# Patient Record
Sex: FEMALE | Race: WHITE
[De-identification: names, ages, dates, MRNs, and addresses within clinical notes are randomized per-mention and may not be internally consistent; named-entity substitution may affect disease eponyms.]

---

## 2017-01-18 NOTE — DIAGNOSTIC IMAGING REPORT
CHEST ONE VIEW PORTABLE



CLINICAL HISTORY: Dizziness. History of pneumonia.    



COMPARISON STUDY:  Chest radiograph November 16, 2016.



FINDINGS: Lung volumes are normal. There is no pneumothorax or pleural effusion.

There is no evidence of pulmonary edema. Cardiomediastinal silhouette is within

normal limits. 



IMPRESSION:  No acute cardiopulmonary findings. 









Electronically signed by:  Rishabh Mcclain M.D.

1/18/2017 3:50 PM



Dictated Date/Time:  1/18/2017 3:49 PM

## 2017-01-18 NOTE — EMERGENCY ROOM VISIT NOTE
History


First contact with patient:  15:13


Chief Complaint:  NEURO SYMPTOMS


Stated Complaint:  NUMBNESS IN HAND, L LEG INTO TOES


Nursing Triage Summary:  


Patient c/o "numbness in all 10 of her fingers, left toes and up to the knee in 


the leg.  I've been having trouble with it the last couple months off and on. 


Today it got worse. Maybe it's just stress, I don't know."





History of Present Illness


The patient is a 61 year old female who presents to the Emergency Room via 

private vehicle with complaints of "numbness in hand, left leg and the toes".  

The patient states that today around 10:30 AM she was at home sitting when she 

notes her left leg started to go numb from the knee distally.  She felt as if 

she was unable to lift the leg, and notes it felt as if it went completely 

numb.  She feels that when she walks she has to drag the left leg.  In addition 

her 10 fingers also feel numb and that has been occurring off and on for nearly 

one year now and she does have a history of carpal tunnel.  She states that her 

legs have gone "numby" off and on for quite some time but nothing quite as 

severe as today.  There is also associated dizziness which began around 10:30.  

The patient feels she is under stress recently and didn feel that she had 

chills and fever a few days ago but none today.  There is associated blurred 

vision in the right eye which she's had for 8 years she believes this secondary 

to her history of head injuries.  She does have a history of blood clots in her 

legs, but after further clarification she noted noted she was not formally 

diagnosed with DVTs.  She did have abdominal pain yesterday but none today.  

She denies any recent falls, injuries, neck pain today, low back pain, arm 

weakness, chest pain, shortness of breath.





Review of Systems


A complete 10-point Review of Systems was discussed with the patient, with 

pertinent positives and negatives listed in the History of Present Illness. All 

remaining Review of Systems questions can be considered negative unless 

otherwise specified.





Past Medical/Surgical History


Medical Problems:


(1) Chest pain


(2) No Known Active Medical Problems


DVT, skin problems, pneumonia, stomach problems





Family History





FH: HTN (hypertension)


FH: cancer


FH: diabetes mellitus


FH: heart disease


FH: lung disease





Social History


Smoking Status:  Former Smoker


Alcohol Use:  occasionally


Drug Use:  none


Housing Status:  lives alone


Occupation Status:  employed





Current/Historical Medications


No Active Prescriptions or Reported Meds





Allergies


Coded Allergies:  


     No Known Allergies (Unverified , 11/16/16)





Physical Exam


Vital Signs











  Date Time  Temp Pulse Resp B/P Pulse Ox O2 Delivery O2 Flow Rate FiO2


 


1/18/17 21:58  77 18 121/70 97   


 


1/18/17 20:40  69 18 127/84 96 Room Air  


 


1/18/17 18:36  76 18 127/47 95 Room Air  


 


1/18/17 16:55  68 20 127/81 95 Room Air  


 


1/18/17 16:13  72      


 


1/18/17 15:56     93 Room Air  


 


1/18/17 15:56  73 19 123/79 94 Room Air  


 


1/18/17 15:09 36.9 82 22 124/68 96 Room Air  











Physical Exam


VITAL SIGNS - Vital signs and nursing notes were reviewed.  Patient is afebrile

, she is normotensive, she is not tachycardic and is saturating well on room 

air at 96%.


GENERAL -61-year-old female appearing her stated age who is in no acute 

distress.  The patient is standing upon my entrance into the exam room with 

support via cane.  Communicates well with provider and answers questions 

appropriately.


SKIN - Without rashes.  This there are no breaks in the integument.


HEAD - NC/AT.


EYES - PERRL with EOMI bilaterally. Sclera anicteric. Palpebral conjunctiva 

pink and moist with no injection noted.  No walls signs or raccoons eyes.


EARS - No deformities of external structures noted on gross examination 

bilaterally. No hemotympanum. External auditory canals without discharge or 

otorrhea. Tympanic membranes pearly gray without retraction or bulging. No 

fluid or purulent material visualized behind the TM. Handle of malleus, umbo, 

cone of light, pars tensa/flaccid all easily visualized.


NOSE - Midline and without cyanosis. No epistaxis or purulent drainage noted.


MOUTH/OROPHARYNX - Without perioral cyanosis. Buccal mucosa pink and moist and 

without leukoplakia. Tongue midline with equal elevation of palate bilaterally. 


NECK - Neck with FROM. No Meningeal signs.


LUNGS - Chest wall symmetric without accessory muscle use, intercostals 

retractions, or central cyanosis. Normal vesicular breath sounds CTA B/L. No 

wheezes, rales, or rhonchi appreciated.


CARDIAC - RRR with S1/S2. No murmur, rubs, or gallops appreciated. 


EXTREMITIES - No clubbing or peripheral cyanosis. No pretibial edema present. 

Vascularly intact. +5/5 strength noted in UE/LE bilaterally.


NEUROLOGIC - Cranial nerves II through XII grossly intact. Sensory intact to 

light touch throughout. Patellar reflexes +2/4. No neurologic deficit.


PSYCH - A&Ox3 and cooperates fully with examiner. Pt is very pleasant and 

interacts well with examiner.





Medical Decision & Procedures


ER Provider


Diagnostic Interpretation:


CT OF THE HEAD WITHOUT CONTRAST





CLINICAL HISTORY: Left leg numb, finger numbness, dizzy    





COMPARISON STUDY:  Head CT November 16, 2016. 





CT DOSE: 614.27 mGy.cm





TECHNIQUE: Helical axial images of the head were obtained without IV contrast.


Automated exposure control was utilized for the study.





FINDINGS: No acute intracranial hemorrhage, midline shift or mass effect is


present. Ventricular system is normal. The basilar cisterns are patent. There


are no extra-axial collections. Gray-white differentiation is maintained. There


are no findings to suggest acute dural sinus thrombosis or acute territorial


infarct. There are no calvarial abnormalities. Visualized portions of the


sinuses and mastoid air cells are clear.





IMPRESSION:  No acute intracranial findings. 











Electronically signed by:  Rishabh Mcclain M.D.


1/18/2017 3:36 PM





Dictated Date/Time:  1/18/2017 3:33 PM





LEFT LOWER EXTREMITY VENOUS DOPPLER





HISTORY:      Left lower leg edema





COMPARISON STUDY:  None.





FINDINGS: There is normal compressibility, flow, and augmentation within the


left lower extremity deep venous system.





IMPRESSION:  


No DVT within the left lower extremity.











Electronically signed by:  Ad Beaulieu M.D.


1/18/2017 6:24 PM





Dictated Date/Time:  1/18/2017 6:23 PM





LEFT LOWER EXTREMITY ARTERIAL DOPPLER STUDY





HISTORY:      Left lower leg edema, numbness








COMPARISON STUDY:  None.





FINDINGS: There are normal triphasic to biphasic waveforms seen throughout the


left lower extremity arterial system. No elevated velocities to suggest


hemodynamically significant stenosis. No areas of arterial occlusion. There is a


small popliteal cyst measuring 3.6 x 1.3 cm. The ankle brachial indices on the


right measured with the posterior tibial artery was 1.4 and the dorsalis pedis


artery was 1.3. The left measured with the posterior tibial artery was 1.3 and


the dorsalis pedis artery was 1.2.





IMPRESSION:  


No hemodynamically significant stenosis seen within the left lower extremity


arterial system. 











Electronically signed by:  Ad Beaulieu M.D.


1/18/2017 6:27 PM





Dictated Date/Time:  1/18/2017 6:25 PM





CHEST ONE VIEW PORTABLE





CLINICAL HISTORY: Dizziness. History of pneumonia.    





COMPARISON STUDY:  Chest radiograph November 16, 2016.





FINDINGS: Lung volumes are normal. There is no pneumothorax or pleural effusion.


There is no evidence of pulmonary edema. Cardiomediastinal silhouette is within


normal limits. 





IMPRESSION:  No acute cardiopulmonary findings. 














Electronically signed by:  Rishabh Mcclain M.D.


1/18/2017 3:50 PM





Dictated Date/Time:  1/18/2017 3:49 PM





Laboratory Results


1/18/17 15:50








Red Blood Count 4.37, Mean Corpuscular Volume 88.3, Mean Corpuscular Hemoglobin 

31.1, Mean Corpuscular Hemoglobin Concent 35.2, Mean Platelet Volume 10.0, 

Neutrophils (%) (Auto) 61.9, Lymphocytes (%) (Auto) 26.8, Monocytes (%) (Auto) 

7.4, Eosinophils (%) (Auto) 3.5, Basophils (%) (Auto) 0.2, Neutrophils # (Auto) 

5.82, Lymphocytes # (Auto) 2.52, Monocytes # (Auto) 0.70, Eosinophils # (Auto) 

0.33, Basophils # (Auto) 0.02





1/18/17 15:50

















Test


  1/18/17


15:50 1/18/17


18:13 1/18/17


20:34


 


White Blood Count


  9.41 K/uL


(4.8-10.8) 


  


 


 


Red Blood Count


  4.37 M/uL


(4.2-5.4) 


  


 


 


Hemoglobin


  13.6 g/dL


(12.0-16.0) 


  


 


 


Hematocrit 38.6 % (37-47)   


 


Mean Corpuscular Volume


  88.3 fL


() 


  


 


 


Mean Corpuscular Hemoglobin


  31.1 pg


(25-34) 


  


 


 


Mean Corpuscular Hemoglobin


Concent 35.2 g/dl


(32-36) 


  


 


 


Platelet Count


  252 K/uL


(130-400) 


  


 


 


Mean Platelet Volume


  10.0 fL


(7.4-10.4) 


  


 


 


Neutrophils (%) (Auto) 61.9 %   


 


Lymphocytes (%) (Auto) 26.8 %   


 


Monocytes (%) (Auto) 7.4 %   


 


Eosinophils (%) (Auto) 3.5 %   


 


Basophils (%) (Auto) 0.2 %   


 


Neutrophils # (Auto)


  5.82 K/uL


(1.4-6.5) 


  


 


 


Lymphocytes # (Auto)


  2.52 K/uL


(1.2-3.4) 


  


 


 


Monocytes # (Auto)


  0.70 K/uL


(0.11-0.59) 


  


 


 


Eosinophils # (Auto)


  0.33 K/uL


(0-0.5) 


  


 


 


Basophils # (Auto)


  0.02 K/uL


(0-0.2) 


  


 


 


RDW Standard Deviation


  41.3 fL


(36.4-46.3) 


  


 


 


RDW Coefficient of Variation


  12.8 %


(11.5-14.5) 


  


 


 


Immature Granulocyte % (Auto) 0.2 %   


 


Immature Granulocyte # (Auto)


  0.02 K/uL


(0.00-0.02) 


  


 


 


Anion Gap


  10.0 mmol/L


(3-11) 


  


 


 


Est Creatinine Clear Calc


Drug Dose 96.6 ml/min 


  


  


 


 


Estimated GFR (


American) 98.0 


  


  


 


 


Estimated GFR (Non-


American 84.5 


  


  


 


 


BUN/Creatinine Ratio 13.9 (10-20)   


 


Calcium Level


  8.9 mg/dl


(8.5-10.1) 


  


 


 


Magnesium Level


  2.0 mg/dl


(1.8-2.4) 


  


 


 


Total Bilirubin


  0.2 mg/dl


(0.2-1) 


  


 


 


Aspartate Amino Transf


(AST/SGOT) 11 U/L (15-37) 


  


  


 


 


Alanine Aminotransferase


(ALT/SGPT) 18 U/L (12-78) 


  


  


 


 


Alkaline Phosphatase


  71 U/L


() 


  


 


 


Troponin I


  < 0.015 ng/ml


(0-0.045) 


  


 


 


Total Protein


  7.1 gm/dl


(6.4-8.2) 


  


 


 


Albumin


  4.0 gm/dl


(3.4-5.0) 


  


 


 


Globulin


  3.1 gm/dl


(2.5-4.0) 


  


 


 


Albumin/Globulin Ratio 1.3 (0.9-2)   


 


Thyroid Stimulating Hormone


(TSH) 0.718 uIu/ml


(0.300-4.500) 


  


 


 


Lyme Disease IgG Antibody NEG (NEG)   


 


Lyme Disease IgM Antibody NEG (NEG)   


 


Prothrombin Time


  


  10.3 SECONDS


(9.0-12.0) 


 


 


Prothromb Time International


Ratio 


  1.0 (0.9-1.1) 


  


 


 


Activated Partial


Thromboplast Time 


  26.8 SECONDS


(21.0-31.0) 


 


 


Partial Thromboplastin Ratio  1.0  


 


Urine Color   YELLOW 


 


Urine Appearance   CLEAR (CLEAR) 


 


Urine pH   6.5 (4.5-7.5) 


 


Urine Specific Gravity


  


  


  1.004


(1.000-1.030)


 


Urine Protein   NEG (NEG) 


 


Urine Glucose (UA)   NEG (NEG) 


 


Urine Ketones   NEG (NEG) 


 


Urine Occult Blood   NEG (NEG) 


 


Urine Nitrite   NEG (NEG) 


 


Urine Bilirubin   NEG (NEG) 


 


Urine Urobilinogen   NEG (NEG) 


 


Urine Leukocyte Esterase   NEG (NEG) 


 


Urine Opiates Screen   NEG (NEG) 


 


Urine Methadone, Qualitative   NEG (NEG) 


 


Urine Barbiturates   NEG (NEG) 


 


Urine Phencyclidine (PCP)


Level 


  


  NEG (NEG) 


 


 


Ur


Amphetamine/Methamphetamine 


  


  NEG (NEG) 


 


 


MDMA (Ecstasy) Screen   NEG (NEG) 


 


Urine Benzodiazepines Screen   NEG (NEG) 


 


Urine Cocaine Metabolite   NEG (NEG) 


 


Urine Marijuana (THC)   NEG (NEG) 











Medical Decision


Patient was seen and evaluated as above.  After obtaining a thorough history 

and physical examination IV access was obtained and a stat CT of the head was 

ordered after concern for potential stroke like symptoms.  Stat CT was obtained 

and was negative.  IV access was initiated and a CBC, CMP, TSH, magnesium, 

regulation studies, UA clean catch culture if indicated, troponin, EKG, chest 1 

view portable, urine drug screen, Lyme screen, RPR, NIH stroke scale secondary 

to subjective and objective information findings.  Monitor was applied and 

continuous pulse ox was initiated.  Above workup was relatively unremarkable 

with a CBC revealing no leukocytosis or anemia, there was no coagulation deficit

, CMP revealed no elect slight abnormality or evidence of kidney failure.  AST 

was slightly low at 11.  TSH was within normal limits.  Lyme disease screen 

negative with RPR pending.  Toxicology screen negative as well as urine 

negative.  With the above workup being relatively unremarkable and the EKG 

revealing a normal sinus rhythm rate of 76 bpm and negative troponin I do not 

suspect any emergent or surgical nature the patient's symptoms at this time.  I 

then ordered an ultrasound of the left lower extremity to verify good arterial 

and venous supply and rule out clot.  Results as above.  No vascular 

compromise.  Chest x-ray results are obtained and was within normal limits.  

Today the patient's workup was unremarkable therefore believe that emergent or 

surgical causes of her symptoms at this time are less likely.  The patient's 

vital signs while here were stable.  Patient was not anemic.  I do believe the 

patient is able to follow-up in the outpatient setting with a neurologist whom 

she indicated she followed with in the past and had good success.  She was 

instructed to call him tomorrow as well as her family doctor to follow up from 

today's visit.  She was educated upon worrisome symptoms in which to return, 

had questions answered prior to discharge and was discharged home in good 

condition.





In evaluation treatment this patient the following differential diagnoses were 

entertained: Arterial compromise of the leg, DVT, cardiac event, pneumonia, 

neurologic disorder, syphilis, Lyme disease, among many others.  I do not 

suspect a leg numbness secondary to cauda equina syndrome as it is only from 

the knee down and she does not have any numbness or tingling in the genital 

region or incontinence.





Impression





 Primary Impression:  


 Dizzy


 Additional Impression:  


 Left leg numbness





Departure Information


Dispostion


Home / Self-Care





Condition


GOOD





Prescriptions





No Active Prescriptions or Reported Meds





Referrals


No Doctor, Assigned (PCP)








YANI Sierra M.D.





Patient Instructions


My [facility]





Additional Instructions





You were seen in the emergency department for dizziness, numbness of your leg 

and fingers.





Your blood work did not reveal any sign of infection or anemia.


There was no slight abnormality.  The kidneys are working well.  Your AST level 

is slightly low at 11.


Your urine was normal.  Your Lyme disease test was negative.





The ultrasounds of your leg did not show any problems with blood flow.  Your 

arteries and veins are working well.


The x-ray of your chest did not reveal any emergent findings.


The CT of your head did not reveal any emergent findings.





Please follow-up with your family doctor regarding today's visit as well as a 

neurologist.  You were given the number for Dr. Sierra.  Please call her family 

doctor and Dr. Sierra first thing tomorrow morning to schedule follow-up.





Please eat a healthy and well balanced diet and drink plenty of water.





Please return to the emergency department with any new/concerning symptoms.





Problem Qualifiers

## 2017-01-18 NOTE — DIAGNOSTIC IMAGING REPORT
CT OF THE HEAD WITHOUT CONTRAST



CLINICAL HISTORY: Left leg numb, finger numbness, dizzy    



COMPARISON STUDY:  Head CT November 16, 2016. 



CT DOSE: 614.27 mGy.cm



TECHNIQUE: Helical axial images of the head were obtained without IV contrast.

Automated exposure control was utilized for the study.



FINDINGS: No acute intracranial hemorrhage, midline shift or mass effect is

present. Ventricular system is normal. The basilar cisterns are patent. There

are no extra-axial collections. Gray-white differentiation is maintained. There

are no findings to suggest acute dural sinus thrombosis or acute territorial

infarct. There are no calvarial abnormalities. Visualized portions of the

sinuses and mastoid air cells are clear.



IMPRESSION:  No acute intracranial findings. 







Electronically signed by:  Rishabh Mcclain M.D.

1/18/2017 3:36 PM



Dictated Date/Time:  1/18/2017 3:33 PM

## 2017-01-18 NOTE — DIAGNOSTIC IMAGING REPORT
LEFT LOWER EXTREMITY VENOUS DOPPLER



HISTORY:      Left lower leg edema



COMPARISON STUDY:  None.



FINDINGS: There is normal compressibility, flow, and augmentation within the

left lower extremity deep venous system.



IMPRESSION:  

No DVT within the left lower extremity.







Electronically signed by:  Ad Beaulieu M.D.

1/18/2017 6:24 PM



Dictated Date/Time:  1/18/2017 6:23 PM

## 2017-01-18 NOTE — DIAGNOSTIC IMAGING REPORT
LEFT LOWER EXTREMITY ARTERIAL DOPPLER STUDY



HISTORY:      Left lower leg edema, numbness





COMPARISON STUDY:  None.



FINDINGS: There are normal triphasic to biphasic waveforms seen throughout the

left lower extremity arterial system. No elevated velocities to suggest

hemodynamically significant stenosis. No areas of arterial occlusion. There is a

small popliteal cyst measuring 3.6 x 1.3 cm. The ankle brachial indices on the

right measured with the posterior tibial artery was 1.4 and the dorsalis pedis

artery was 1.3. The left measured with the posterior tibial artery was 1.3 and

the dorsalis pedis artery was 1.2.



IMPRESSION:  

No hemodynamically significant stenosis seen within the left lower extremity

arterial system. 







Electronically signed by:  Ad Beaulieu M.D.

1/18/2017 6:27 PM



Dictated Date/Time:  1/18/2017 6:25 PM

## 2017-05-17 NOTE — EMERGENCY ROOM VISIT NOTE
ED Visit Note


First contact with patient:  11:17


Chief Complaint: Left lower leg pain.





History of Present Illness: Geovanna gregory is a 61-year-old white female who is 

ambulates into the ED using a cane complaining of left lower leg pain, swelling 

and numbness.


Patient reports she was her normal self until approximately 2 weeks ago.  She 

reports after being outside she felt the back of her leg and noted a tick 

embedded in her leg over the Achilles tendon.  She removed the tick without 

difficulty but was unsure she removed all of the tick.  She reports over the 

last 2 weeks she has noted the area where the tick removed started scabbing 

over and became painful.  She goes on to report that over the last 2 days she 

has developed numbness sensation throughout the lower leg and she feels that 

her leg is swollen.  Additionally she reports that she feels like she has 

weakness in her knee and that when she ambulates is going to give out.


She describes her discomfort as a throbbing sensation throughout the lower leg.

  She rates her discomfort 6/10.  Her pain is nonradiating.  Her pain worsens 

with ambulation.  She has not identified any alleviating factors related to the 

pain.  She has not taken any medications for pain prior to arrival at the 

hospital.  As previously noted associated with her pain she does report she has 

a numbness sensation throughout the leg and she feels that the leg is swollen.  

Additionally she reports last evening she was running a temperature of 100F 

and is unsure this has anything to do with her current symptoms.


She denies skin eruptions, skin color changes, headache, dizziness, 

lightheadedness, upper respiratory tract symptoms, shortness of breath, cough, 

abdominal pain, nausea, vomiting, decreased appetite, previous clots, 

claudication, cramping, other joint pains, previous Lyme's disease.





Review of Systems: As noted above in history of present illness.  All body 

systems were reviewed and found to be negative as noted above.





Past Medical History: Unspecified heart disease, unspecified skin disorder, 

pneumonia, unspecified stomach disorder, lower extremity DVT.


Current Medications: Patient denies.


Allergies to Medications: Patient denies.


Social History: Patient is not currently employed; she feels safe in her home 

environment; she admits to tobacco and alcohol use.





Physical Examination:


Vital Signs:








  Date Time  Temp Pulse Resp B/P Pulse Ox O2 Delivery O2 Flow Rate FiO2


 


5/17/17 13:10  74 18 111/90 97   


 


5/17/17 10:20 37.0 79 22 126/59 95 Room Air  





GENERAL: 61-year-old  female in mild distress due to pain, nontoxic-appearing, 

afebrile and hemodynamically stable.


NEUROLOGICAL: Awake, alert and oriented to person, place and time.  Answering 

questions appropriately and following commands.   Good hand eye coordination.  

No focal motor or sensory deficits.


SKIN: Warm, dry and pink.  Left Lower Extremity: Over the posterior aspect of 

the lower leg over the Achilles tendon patient has a dime sized scab lesion.  

There is no local erythema or edema.  There is no local lymphadenitis.  I did 

not unroofed the lesion to see if there is any remanent of tick.  This area is 

mildly tender to palpation but does not appear infected.


HEENT:  Atraumatic and normocephalic.  PERRLA.  Sclera white and conjunctiva 

pink.  No drainage from naris.  Oral cavity moist and pink.  Pharynx is 

nonerythematous or edematous.  Speech normal.  No lymphadenopathy.  Trachea 

midline.  No jugular venous distention.


THORAX:  Lungs sounds are clear to auscultation and equal bilaterally with 

symmetrical chest wall.  No wheezing, rales or rhonchi.  No crepitus, tenderness

, subcutaneous air or deformities noted.


HEART:  Regular rate and rhythm.  No gallops, rubs or murmurs are appreciated.


ABDOMEN: Flat, soft and nontender.  Positive bowel sounds in all quadrants.  No 

guarding, rigidity or organomegaly.


LEFT LOWER EXTREMITY: No gross bony deformity.  No shortening or malrotation.  

No tenderness in the hip, knee, ankle or foot.  Negative ballottement test.  

Negative apprehension test.  No laxity of the collateral cruciate ligaments.  

No lateral or medial joint line tenderness.  Negative Chaya's test.  Mild 

tenderness over her scabbed area as previously noted on SKIN.  I do not 

appreciate any calf tenderness or cords.  There is mild dependent edema in the 

leg.  Full range of motion of the hip, knee, ankle and foot.  Distal pulses are 

intact and equal bilaterally.  Capillary refill is brisk in the feet.  Patient 

was able to distinguish light sensations through all dermatomes of the feet.  

No calf tenderness or cords.





ED Course:


Patient is assessed as noted above.


Laboratory Testing:








Test


  5/17/17


11:50 Range/Units


 


 


White Blood Count 8.65 4.8-10.8  K/uL


 


Red Blood Count 4.55 4.2-5.4  M/uL


 


Hemoglobin 14.1 12.0-16.0  g/dL


 


Hematocrit 41.3 37-47  %


 


Mean Corpuscular Volume 90.8   fL


 


Mean Corpuscular Hemoglobin 31.0 25-34  pg


 


Mean Corpuscular Hemoglobin


Concent 34.1


  32-36  g/dl


 


 


Platelet Count 242 130-400  K/uL


 


Mean Platelet Volume 9.8 7.4-10.4  fL


 


Neutrophils (%) (Auto) 68.6  %


 


Lymphocytes (%) (Auto) 23.1  %


 


Monocytes (%) (Auto) 5.5  %


 


Eosinophils (%) (Auto) 2.4  %


 


Basophils (%) (Auto) 0.3  %


 


Neutrophils # (Auto) 5.92 1.4-6.5  K/uL


 


Lymphocytes # (Auto) 2.00 1.2-3.4  K/uL


 


Monocytes # (Auto) 0.48 0.11-0.59  K/uL


 


Eosinophils # (Auto) 0.21 0-0.5  K/uL


 


Basophils # (Auto) 0.03 0-0.2  K/uL


 


RDW Standard Deviation 43.2 36.4-46.3  fL


 


RDW Coefficient of Variation 12.9 11.5-14.5  %


 


Immature Granulocyte % (Auto) 0.1  %


 


Immature Granulocyte # (Auto) 0.01 0.00-0.02  K/uL


 


Sodium Level 140 136-145  mmol/L


 


Potassium Level 4.0 3.5-5.1  mmol/L


 


Chloride Level 105   mmol/L


 


Carbon Dioxide Level 31 21-32  mmol/L


 


Anion Gap 4.0 3-11  mmol/L


 


Blood Urea Nitrogen 20 7-18  mg/dl


 


Creatinine


  0.72


  0.60-1.20


mg/dl


 


Est Creatinine Clear Calc


Drug Dose 106.8


   ml/min


 


 


Estimated GFR (


American) 106.2


   


 


 


Estimated GFR (Non-


American 91.7


   


 


 


BUN/Creatinine Ratio 27.8 10-20  


 


Random Glucose 95 70-99  mg/dl


 


Calcium Level 8.8 8.5-10.1  mg/dl


 


Lyme Disease IgG Antibody NEG NEG  


 


Lyme Disease IgM Antibody NEG NEG  





Left Lower Leg Venous Doppler Ultrasound: Was reviewed by myself and read by 

the radiologist showing no deep vein thrombus.


Patient was reassessed multiple times during her stay in the emergency 

department.


Patient's knee was wrapped with an Ace bandage for support.


Patient's case was reviewed with Dr. Barrientos; we agreed on diagnostic 

approach, treatment, disposition and plan.


Patient was educated about today's findings and instructed on her treatment plan

; she verbalizes understanding and agreement with this plan.





Clinical Impression: Left lower leg pain.  Mild dependent edema.  Recent tick 

bite.





Decision-Making: Initially my differential diagnosis I considered Lyme's disease

, DVT, skin infection, Achilles tendinitis and other causes.





Disposition: Patient discharged home in stable condition; prior to departure 

she was reassessed and subjectively reported she was feeling better and rated 

her discomfort 3/10.





Plan:


Patient was encouraged to alternate ibuprofen and acetaminophen every 3 hours 

as needed for pain.


Patient was encouraged to continue using the Ace bandage and her cane for 

ambulation.


Patient was encouraged to keep her leg elevated while at rest.


Patient was encouraged to follow-up with her PCP for recheck.


Patient was encouraged return the ED for worsening/uncontrolled pain, 

increasing swelling, worsening leg numbness/tingling, the development of redness

, swelling, red streaking, pus like drainage or fever in the area of her tick 

wound or any new/concerning symptoms.

## 2017-05-17 NOTE — DIAGNOSTIC IMAGING REPORT
Venous Doppler left leg LEFT VENOUS DOPP LOWER EXT UNILAT



CLINICAL HISTORY: L lower leg, swelling and weakness pain. Edema.



TECHNIQUE: Venous Doppler



COMPARISON STUDY:  1/18/2017



FINDINGS: Normal study



IMPRESSION:  Normal study 









Electronically signed by:  Ziyad Green M.D.

5/17/2017 12:11 PM



Dictated Date/Time:  5/17/2017 12:11 PM

## 2018-01-19 NOTE — DIAGNOSTIC IMAGING REPORT
HEAD WITHOUT CONTRAST (CT)



CLINICAL HISTORY: 62 years-old Female with confusion.  Acute confusion  



TECHNIQUE: Multiple axial CT images of the head were obtained without contrast. 

A dose lowering technique was utilized adhering to the principles of ALARA.



CT DOSE:  537.48 mGy.cm



COMPARISON: CT head 1/18/2017.



FINDINGS: 



No acute intracranial hemorrhage, midline shift, intracranial mass,

hydrocephalus, territorial ischemia or abnormal extra-axial collection.



The calvarium is intact.  The paranasal sinuses, mastoid air cells, and middle

ear cavities are clear. Large right kymberly bullosa. Mild leftward bowing of the

nasal septum.



IMPRESSION:  No acute intracranial abnormality.







The above report was generated using voice recognition software. It may contain

grammatical, syntax or spelling errors.







Electronically signed by:  Stepan Gupta M.D.

1/19/2018 6:46 PM



Dictated Date/Time:  1/19/2018 6:44 PM

## 2018-01-19 NOTE — EMERGENCY ROOM VISIT NOTE
ED Visit Note


First contact with patient:  16:37


Staff note:


I have reviewed the Patients chart and have discussed this case with my PA. I 

generally agree with the ED note and findings.

## 2018-01-19 NOTE — DIAGNOSTIC IMAGING REPORT
(CHEST FOR PE) ANGIO WITH



CT DOSE: 2265.93 mGy.cm



HISTORY:  62 years-old Female presents with acute atypical chest pain



TECHNIQUE: Multiple CTA images of the chest were obtained after the intravenous

administration of 116 ml Optiray 320.  Coronal and sagittal MIPS were obtained

from the axial data set and were submitted for review.  A dose lowering

technique was utilized adhering to the principles of ALARA.



COMPARISON: Portable chest radiograph of same day.



FINDINGS: 



CTA:  

Heart appears normal in size without pericardial effusion. Thoracic aorta is

normal in both course and caliber without aneurysm or dissection. 4 vessel

aortic arch is noted with the left vertebral artery emanating directly from the

arch. Imaged great vessels appear to be patent. Pulmonary arterial tree is

opacified to the level of the proximal lobar branches. The segmental and

subsegmental branches are not well opacified secondary to contrast bolus timing.

Within the limitations of the study, no evidence of pulmonary thromboembolic

disease.

 

CT CHEST:  

Thyroid appears homogeneous. No pathologic adenopathy of the chest identified.



No pneumothorax, pleural effusion, focal airspace consolidation or overt

pulmonary edema. There are no suspicious pulmonary nodules or masses identified.

Central airways appear patent.



No acute abnormality of the imaged upper abdomen. Soft tissues are unremarkable.

Bones appear intact. Multilevel endplate degenerative changes of the spine.





IMPRESSION:  

1. No acute intrathoracic abnormality identified, specifically no acute aortic

pathology or evidence of pulmonary thromboembolic disease. Study is limited as

above secondary to contrast bolus timing.

2. No pathologic adenopathy or lobar airspace consolidation.







The above report was generated using voice recognition software. It may contain

grammatical, syntax or spelling errors.







Electronically signed by:  Stepan Gupta M.D.

1/19/2018 10:04 PM



Dictated Date/Time:  1/19/2018 9:59 PM

## 2018-01-19 NOTE — DIAGNOSTIC IMAGING REPORT
ABDOMEN AND PELVIS CT WITH IV CONTRAST



HISTORY: Acute generalized abdominal pain  abd pain



TECHNIQUE: Multiaxial CT images of the abdomen and pelvis were performed

following the use of intravenous contrast.  A dose lowering technique was

utilized adhering to the principles of ALARA.



COMPARISON STUDY: CTA of the chest of same day.



FINDINGS: 

Lung bases appear clear. No pneumatosis or pneumoperitoneum. Imaged inferior

cardiac chambers are unremarkable.



Liver, spleen, pancreas and right adrenal gland appear unremarkable. Thickening

of the left adrenal gland suggests hyperplasia. Gallbladder is unremarkable.

Intermediate attenuating 2.5 cm circumscribed lesion of the medial interpolar

left kidney is noted. Parenchymal scarring is noted involving the superior pole

right kidney. No renal calculi or hydronephrosis. Ureters and urinary bladder

are within normal limits. There is an ovoid 5.1 x 5.5 cm lesion involving the

left hemipelvis within the left aspect of the uterus. Adnexa appear

unremarkable.



Aorta is normal in course and caliber. No bulky adenopathy. Probable lipoma of

the third portion duodenum measures 7 mm. No bowel obstruction or focal bowel

wall thickening. The appendix appears normal.



Soft tissues are unremarkable. Small calcified granuloma the left subcutaneous

gluteal region. Multilevel endplate degenerative changes of the spine.





IMPRESSION: 



1. No acute intra-abdominal or intrapelvic abnormality identified.

2. 5.1 x 5.5 cm ovoid lesion of the left aspect of the uterus suggests uterine

leiomyoma. 

3. Intermediate attenuating ovoid 2.5 cm lesion of the medial interpolar left

kidney may reflect a mildly complex renal cyst and could be further

characterized with a renal ultrasound.

4. Normal appendix.







Electronically signed by:  Stepan Gupta M.D.

1/19/2018 10:12 PM



Dictated Date/Time:  1/19/2018 10:04 PM

## 2018-01-19 NOTE — DIAGNOSTIC IMAGING REPORT
CHEST ONE VIEW PORTABLE



HISTORY:  62 years-old Female fever acute fever with flulike symptoms



COMPARISON: Chest radiograph 1/18/2017



TECHNIQUE: Portable AP view of the chest



FINDINGS: 

Cardiomediastinal and hilar silhouettes are within normal limits.

Atherosclerosis of the aorta. Right lung apex is partially secured by the

patient's chin. No pneumothorax, pleural effusion, focal airspace consolidation

or overt pulmonary edema. The bones of the chest appear grossly intact.



IMPRESSION: No acute process. 







The above report was generated using voice recognition software. It may contain

grammatical, syntax or spelling errors.







Electronically signed by:  Stepan Gupta M.D.

1/19/2018 5:00 PM



Dictated Date/Time:  1/19/2018 4:59 PM

## 2018-01-19 NOTE — EMERGENCY ROOM VISIT NOTE
History


First contact with patient:  15:45


Chief Complaint:  FLU LIKE SX


Stated Complaint:  FLU LIKE SX, DIZZY, NAUSEA, FULL BODY PAIN,





History of Present Illness


The patient is a 62 year old female who presents to the Emergency Room with 

complaints of flulike symptoms.  The patient was brought in by the [place] 

police.  She reportedly got aggressive towards a home health care worker taking 

care of her mother.  The patient is currently refusing most of my questions.  

She does complain of a headache and fever.  She also complains of bilateral 

hand numbness and tingling.  This has been going on for several days.  She is 

concerned that she may have Lyme disease as she had a tick bite last year.





I contacted the patient's daughter, who says that she has a history of "

chemical imbalance."  She has not been taking any medications for years.  She 

also reportedly has not been to a doctor in several years.  She is reportedly 

living with her mother.  She denies any suicidal or homicidal ideations.





Review of Systems


10 system review performed and  negative unless noted in HPI or below.  This 

was done to the best of my ability





Past Medical/Surgical History


Medical Problems:


(1) Altered mental status


(2) Chest pain


(3) No Known Active Medical Problems








Family History





FH: HTN (hypertension)


FH: cancer


FH: diabetes mellitus


FH: heart disease


FH: lung disease





Social History


Smoking Status:  Never Smoker


Alcohol Use:  occasionally


Drug Use:  none


Housing Status:  lives with family


Occupation Status:  employed





Current/Historical Medications


Unable to Obtain Active Prescriptions or Reported Meds





Physical Exam


Vital Signs











  Date Time  Temp Pulse Resp B/P (MAP) Pulse Ox O2 Delivery O2 Flow Rate FiO2


 


1/19/18 21:11  87 16 137/92 96 Room Air  


 


1/19/18 20:00  89 16 139/92 97 Room Air  


 


1/19/18 19:28  97 16 105/77 95 Room Air  


 


1/19/18 18:51  97 16 109/89 94 Room Air  


 


1/19/18 17:44  95 16 126/88 93 Room Air  


 


1/19/18 16:37 36.9 96 16 103/85 94 Room Air  











Physical Exam


VITALS: Vitals are noted on the nurse's note and reviewed by myself.  Vital 

signs stable.


GENERAL: 62-year-old female, disheveled in appearance,


SKIN: The skin was without rashes, erythema, edema, or bruising. 


HEAD: Normocephalic atraumatic.  


EYES: Pupils equal round and reactive to light and accommodation.  Conjunctivae 

without injection, sclerae without icterus.  Extraocular movements intact.  


\MOUTH: Mucous membranes slightly dry


NECK: Supple without nuchal rigidity. .  No JVD.


HEART: Regular rate and rhythm without murmurs gallops or rubs.


LUNGS: Clear to auscultation bilaterally without wheezes, rales or rhonchi.   

No accessory muscle use.


ABDOMEN: Positive bowel sounds x 4.Soft, 


MUSCULOSKELETAL: No muscle atrophy, erythema, or edema noted.   Strength 5/5 

throughout.


NEURO: Patient was alert and oriented to person and place.  She does not follow 

commands.  She is moving all of her extremities without difficulty.





Medical Decision & Procedures


Laboratory Results


1/19/18 16:51








Red Blood Count 4.76, Mean Corpuscular Volume 90.3, Mean Corpuscular Hemoglobin 

30.9, Mean Corpuscular Hemoglobin Concent 34.2, Mean Platelet Volume 10.2, 

Neutrophils (%) (Auto) 77.3, Lymphocytes (%) (Auto) 15.5, Monocytes (%) (Auto) 

5.3, Eosinophils (%) (Auto) 1.3, Basophils (%) (Auto) 0.3, Neutrophils # (Auto) 

8.67, Lymphocytes # (Auto) 1.74, Monocytes # (Auto) 0.60, Eosinophils # (Auto) 

0.15, Basophils # (Auto) 0.03





1/19/18 16:51

















Test


  1/19/18


16:51 1/19/18


16:54 1/19/18


16:58 1/19/18


19:55


 


White Blood Count


  11.22 K/uL


(4.8-10.8) 


  


  


 


 


Red Blood Count


  4.76 M/uL


(4.2-5.4) 


  


  


 


 


Hemoglobin


  14.7 g/dL


(12.0-16.0) 


  


  


 


 


Hematocrit 43.0 % (37-47)    


 


Mean Corpuscular Volume


  90.3 fL


() 


  


  


 


 


Mean Corpuscular Hemoglobin


  30.9 pg


(25-34) 


  


  


 


 


Mean Corpuscular Hemoglobin


Concent 34.2 g/dl


(32-36) 


  


  


 


 


Platelet Count


  273 K/uL


(130-400) 


  


  


 


 


Mean Platelet Volume


  10.2 fL


(7.4-10.4) 


  


  


 


 


Neutrophils (%) (Auto) 77.3 %    


 


Lymphocytes (%) (Auto) 15.5 %    


 


Monocytes (%) (Auto) 5.3 %    


 


Eosinophils (%) (Auto) 1.3 %    


 


Basophils (%) (Auto) 0.3 %    


 


Neutrophils # (Auto)


  8.67 K/uL


(1.4-6.5) 


  


  


 


 


Lymphocytes # (Auto)


  1.74 K/uL


(1.2-3.4) 


  


  


 


 


Monocytes # (Auto)


  0.60 K/uL


(0.11-0.59) 


  


  


 


 


Eosinophils # (Auto)


  0.15 K/uL


(0-0.5) 


  


  


 


 


Basophils # (Auto)


  0.03 K/uL


(0-0.2) 


  


  


 


 


RDW Standard Deviation


  41.9 fL


(36.4-46.3) 


  


  


 


 


RDW Coefficient of Variation


  12.7 %


(11.5-14.5) 


  


  


 


 


Immature Granulocyte % (Auto) 0.3 %    


 


Immature Granulocyte # (Auto)


  0.03 K/uL


(0.00-0.02) 


  


  


 


 


Anion Gap


  7.0 mmol/L


(3-11) 


  


  


 


 


Est Creatinine Clear Calc


Drug Dose 110.8 ml/min 


  


  


  


 


 


Estimated GFR (


American) 109.7 


  


  


  


 


 


Estimated GFR (Non-


American 94.6 


  


  


  


 


 


BUN/Creatinine Ratio 14.0 (10-20)    


 


Calcium Level


  9.2 mg/dl


(8.5-10.1) 


  


  


 


 


Magnesium Level


  1.9 mg/dl


(1.8-2.4) 


  


  


 


 


Total Bilirubin


  0.3 mg/dl


(0.2-1) 


  


  


 


 


Aspartate Amino Transf


(AST/SGOT) 12 U/L (15-37) 


  


  


  


 


 


Alanine Aminotransferase


(ALT/SGPT) 20 U/L (12-78) 


  


  


  


 


 


Alkaline Phosphatase


  77 U/L


() 


  


  


 


 


Troponin I


  < 0.015 ng/ml


(0-0.045) 


  


  


 


 


Total Protein


  7.8 gm/dl


(6.4-8.2) 


  


  


 


 


Albumin


  4.2 gm/dl


(3.4-5.0) 


  


  


 


 


Globulin


  3.6 gm/dl


(2.5-4.0) 


  


  


 


 


Albumin/Globulin Ratio 1.2 (0.9-2)    


 


Thyroid Stimulating Hormone


(TSH) 1.340 uIu/ml


(0.300-4.500) 


  


  


 


 


Salicylates Level


  7.2 mg/dl


(2.8-20) 


  


  


 


 


Acetaminophen Level


  < 2 ug/ml


(10-30) 


  


  


 


 


Ethyl Alcohol mg/dL


  < 3.0 mg/dl


(0-3) 


  


  


 


 


Lyme Disease IgG Antibody NEG (NEG)    


 


Lyme Disease IgM Antibody NEG (NEG)    


 


Influenza Type A Antigen


  


  


  Neg for Influ


A (NEG) 


 


 


Influenza Type B Antigen


  


  


  Neg for Influ


B (NEG) 


 


 


Urine Color    YELLOW 


 


Urine Appearance    CLEAR (CLEAR) 


 


Urine pH    5.5 (4.5-7.5) 


 


Urine Specific Gravity


  


  


  


  1.015


(1.000-1.030)


 


Urine Protein    NEG (NEG) 


 


Urine Glucose (UA)    NEG (NEG) 


 


Urine Ketones    NEG (NEG) 


 


Urine Occult Blood    NEG (NEG) 


 


Urine Nitrite    NEG (NEG) 


 


Urine Bilirubin    NEG (NEG) 


 


Urine Urobilinogen    NEG (NEG) 


 


Urine Leukocyte Esterase    NEG (NEG) 


 


Test


  1/19/18


20:10 


  


  


 


 


Urine Opiates Screen NEG (NEG)    


 


Urine Methadone, Qualitative NEG (NEG)    


 


Urine Barbiturates NEG (NEG)    


 


Urine Phencyclidine (PCP)


Level NEG (NEG) 


  


  


  


 


 


Ur


Amphetamine/Methamphetamine NEG (NEG) 


  


  


  


 


 


MDMA (Ecstasy) Screen NEG (NEG)    


 


Urine Benzodiazepines Screen NEG (NEG)    


 


Urine Cocaine Metabolite NEG (NEG)    


 


Urine Marijuana (THC) NEG (NEG)    











Medications Administered











 Medications


  (Trade)  Dose


 Ordered  Sig/Katie


 Route  Start Time


 Stop Time Status Last Admin


Dose Admin


 


 Acetaminophen


  (Tylenol Tab)  1,000 mg  NOW  STAT


 PO  1/19/18 18:17


 1/19/18 18:18 DC 1/19/18 18:23


1,000 MG


 


 Ketorolac


 Tromethamine


  (Toradol Inj)  30 mg  NOW  STAT


 IV  1/19/18 18:17


 1/19/18 18:18 DC 1/19/18 18:24


30 MG











ECG


Indication:  other


Rate (beats per minute):  98


Rhythm:  normal sinus


Findings:  other (Q waves in the anterior leads.)


Change:  no significant change





ED Course


Patient was seen and examined


Vital signs including  blood pressure were reviewed


medications list was verified with patient


Labs were obtained, and a saline lock was established


Upon reevaluation, the patient was answering most questions appropriately.  She 

would however occasionally answer inappropriately.


She was evaluated by the psychiatric liaison.


I discussed the patient's workup with her and her family.  They voiced 

understanding.


The case was discussed with supervising physician who personally evaluated the 

patient.


It was also discussed with case management.  


I had a long discussion with the family regarding possible diagnosis and 

treatment plans.  They did not feel comfortable discharging her home.


I spoke with the [facility] hospitalist group, who kindly agreed to admit the 

patient for further evaluation





Medical Decision


Differential diagnosis: TIA, CVA, medical noncompliance, neglect, altered 

mental status, metabolic encephalopathy, infectious etiology, john, other 

psychiatric disorders





This patient is a 62-year-old female that was brought into the emergency 

department by the police with complaints of flulike symptoms and numbness and 

tingling in her hands.  Upon arrival, it was unclear if the patient was able to 

make decisions for her care as she was refusing to answer any questions.  She 

also clearly has an underlying psychiatric disorder.  The etiology of her 

numbness and tingling is unclear.  Her sensation appeared to be intact in her 

hands.  CT of the head was negative.  Workup was otherwise unremarkable.  There 

is concern that the patient may be having a manic episode as she was answering 

some questions inappropriately.  She also has no one to care for her at home.  I

'm concerned for her safety.  Did not feel comfortable discharging patient 

home.  For this reason, she was evaluated hospitalist who kindly agreed to keep 

the patient overnight for further workup and treatment.





This chart was completed in part utilizing Dragon Speech Voice Recognition 

software. Attempts were made to minimize the grammatical errors, random word 

insertions, pronoun errors and incomplete sentences. Any formal questions or 

concerns about the content, text or information contained within the body of 

this dictation should be directly addressed to the provider for clarification.





Impression





 Primary Impression:  


 Altered mental status





Departure Information


Dispostion


Home / Self-Care





Condition


GOOD





Prescriptions





Unable to Obtain Active Prescriptions or Reported Meds





Referrals


No Doctor, Assigned (PCP)








Jennifer Diop MD





Patient Instructions


My [facility]





Additional Instructions





You had been evaluated in the emergency department for body aches and fever 

symptoms.  A flu test was done and negative.  Chest x-ray was also normal.





Please follow-up with a primary care physician and/or psychologist/psychiatrist 

as soon as possible.  A number to a psychiatrist's office was provided.





Ibuprofen 600 mg and/or Tylenol 1000 mg every 8 hours for pain and fever


You may also alternate these medications for more effective pain relief:


Ibuprofen --4 HRS--> Tylenol --4 HRS--> ibuprofen --4 HRS--> Tylenol ....





Stay well hydrated.  Drink plenty of water.





Do not hesitate to return to the emergency department with any new, worsening 

or concerning symptoms; especially, thoughts of harming your self or others, or 

not feeling safe at home.

## 2018-01-20 NOTE — DISCHARGE INSTRUCTIONS
Discharge Instructions


Date of Service


Jan 20, 2018.





Admission


Reason for Admission:  Chest Pain





Discharge


Discharge Diagnosis / Problem:  Atypical Chest pain





Discharge Goals


Goal(s):  Decrease discomfort, Improve function





Activity Recommendations


Activity Limitations:  resume your previous activity


Exercise/Sports Limitations:  as tolerated





.





Instructions / Follow-Up


Instructions / Follow-Up


Follow up with Dr. Daigle for primary care at [facility] 

on 1/25/18 at 12:55pm


Follow up with your OBGYN (For Uterine Fibroid) and Urologist (For Renal Cyst) 

as advised


Seek immediate medical attention if your symptoms reoccur or worsen





Current Hospital Diet


Patient's current hospital diet: AHA Diet (Heart Healthy)





Discharge Diet


Recommended Diet:  AHA Diet (Heart Healthy)





Pending Studies


Studies pending at discharge:  no





Medical Emergencies








.


Who to Call and When:





Medical Emergencies:  If at any time you feel your situation is an emergency, 

please call 911 immediately.





.





Non-Emergent Contact


Non-Emergency issues call your:  Primary Care Provider


Call Non-Emergent contact if:  you have a fever, your pain is not controlled, 

your pain is worsening, your pain is unusual for you, your pain is concerning 

you, you have any medication questions


Seek immediate medical attention if your symptoms reoccur or worsen


.


.








"Provider Documentation" section prepared by Kali Kumar.








.





VTE Core Measure


Inpt VTE Proph given/why not?:  Enoxaparin (Lovenox)SQ

## 2018-01-20 NOTE — ECHOCARDIOGRAM REPORT
*NOTICE TO RECEIVING PARTY AGENCY**  This information is strictly Confidential and protected under 
[place] law.  [place] law prohibits you from making any further disclosure of this 
information unless further disclosure is expressly permitted by the written consent of the person to 
whom it pertains or is authorized by law.  A general authorization for the release of medical or 
other information is not sufficient for this purpose.  Hospital accepts no responsibility if the 
information is made available to any other person, INCLUDING THE PATIENT.



Interpretation Summary

  *  Name: MIRNA FORBES  Study Date: 2018 11:10 AM  BP: 116/79 mmHg

  *  MRN: [id number]  Patient Location: .2T\S\S237\S\1  HR: 95

  *  : 1955 (M/d/yyyy)  Gender: Female  Height: 66 in

  *  Age: 62 yrs  Ethnicity: [place]  Weight: 224 lb

  *  Ordering Physician: Sharan Corrales

  *  Performed By: Irma Puente RDCS

  *  Accession# [id number]  Account# [id number]

  *  Reason For Study: Chest pain

  *  BSA: 2.1 m2

  *  -- Conclusions --

  *  Normal LV chamber size and wall thickness.

  *  Normal LV systolic function, EF 60-65%.

  *  No segmental left ventricular wall motion abnormalities are noted.

  *  Grade I diastolic dysfunction.

  *  No significant valvular pathology.

Procedure Details

  *  A complete two-dimensional transthoracic echocardiogram was performed (2D, M-mode, Doppler and 
color flow Doppler).

Left Ventricle

  *  The left ventricle is normal in size.

  *  There is normal left ventricular wall thickness.

  *  Left ventricular systolic function is normal.

  *  No segmental left ventricular wall motion abnormalities are noted.

  *  Ejection Fraction = 60-65%.

  *  The left ventricular wall motion is normal.

Right Ventricle

  *  The right ventricular cavity size is normal (basal dimension <4.2 cm in right ventricular 
apical 4-chamber view).

  *  The right ventricular systolic function is normal as assessed by tricuspid annular plane 
systolic excursion (TAPSE) (normal >1.5 cm).

Atria

  *  The left atrial size is normal.

  *  Right atrial size is normal.

  *  No ASD detected; PFO is not assessed.

Mitral Valve

  *  The mitral valve is normal in structure and function.

Tricuspid Valve

  *  The tricuspid valve is normal in structure and function.

Aortic Valve

  *  The aortic valve is normal in structure and function.

Pulmonic Valve

  *  The pulmonary valve is not well seen, but the Doppler examination is normal without significant 
regurgitation or stenosis.

Great Vessels

  *  The aortic root and proximal ascending aorta are normal sized.

Pericardium/Pleural

  *  There is no pericardial effusion.

Left Ventricular Diastolic Function

  *  Grade I diastolic dysfunction, (abnormal relaxation pattern).



MMode 2D Measurements and Calculations

IVSd 0.69 cm



LVIDd 4.8 cm

LVIDs 3.1 cm

LVPWd 1.1 cm



IVS/LVPW 0.63 

FS 35.0 %

EDV(Teich) 108.9 ml

ESV(Teich) 39.0 ml

EF(Teich) 64.1 %



EDV(cubed) 112.4 ml

ESV(cubed) 30.9 ml

EF(cubed) 72.5 %





LV mass(C)d 145.9 grams

LV mass(C)dI 69.5 grams/m\S\2



SV(Teich) 69.9 ml

SI(Teich) 33.3 ml/m\S\2

SV(cubed) 81.5 ml

SI(cubed) 38.8 ml/m\S\2



Ao root diam 3.1 cm

Ao root area 7.4 cm\S\2

ACS 2.0 cm

LA dimension 3.3 cm



asc Aorta Diam 3.1 cm





LA/Ao 1.1 

LVOT diam 2.0 cm

LVOT area 3.1 cm\S\2



LVAd ap4 28.2 cm\S\2

LVLd ap4 7.8 cm

EDV(MOD-sp4) 84.0 ml

EDV(sp4-el) 87.1 ml

LVAs ap4 13.9 cm\S\2

LVLs ap4 6.0 cm

ESV(MOD-sp4) 28.5 ml

ESV(sp4-el) 27.5 ml

EF(MOD-sp4) 66.1 %

EF(sp4-el) 68.4 %



LVAd ap2 26.6 cm\S\2

LVLd ap2 7.2 cm

EDV(MOD-sp2) 83.7 ml

EDV(sp2-el) 83.7 ml

LVAs ap2 14.5 cm\S\2

LVLs ap2 6.4 cm

ESV(MOD-sp2) 28.5 ml

ESV(sp2-el) 27.9 ml

EF(MOD-sp2) 65.9 %

EF(sp2-el) 66.6 %



LVLd %diff -8.39 %

EDV(MOD-bp) 87.2 ml

LVLs %diff 6.2 %

ESV(MOD-bp) 29.5 ml

EF(MOD-bp) 66.2 %





SV(MOD-sp4) 55.5 ml

SI(MOD-sp4) 26.5 ml/m\S\2



SV(MOD-sp2) 55.2 ml

SI(MOD-sp2) 26.3 ml/m\S\2



SV(MOD-bp) 57.8 ml

SI(MOD-bp) 27.5 ml/m\S\2



SV(sp4-el) 59.6 ml

SI(sp4-el) 28.4 ml/m\S\2





SV(sp2-el) 55.8 ml

SI(sp2-el) 26.6 ml/m\S\2













Doppler Measurements and Calculations

MV A max guru 109.6 cm/sec



MV dec time 0.20 sec



Ao V2 max 137.1 cm/sec

Ao max PG 7.5 mmHg

Ao max PG (full) 2.3 mmHg

MERARY(V,A) 2.6 cm\S\2

MERARY(V,D) 2.6 cm\S\2



LV V1 max PG 5.2 mmHg





LV V1 max 114.0 cm/sec



PA V2 max 105.9 cm/sec

PA max PG 4.5 mmHg

PA acc slope 475.3 cm/sec\S\2

PA acc time 0.14 sec



PA pr(Accel) 15.6 mmHg

## 2018-01-20 NOTE — HISTORY & PHYSICAL EXAMINATION
DATE OF ADMISSION:  01/19/2018

 

PRIMARY CARE DOCTOR.  None.

 

CHIEF COMPLAINT:  Flu-like symptoms as per records.

 

HISTORY OF PRESENT ILLNESS: 



History obtained from patient, family, records.

 

Medical history significant for intermittent tobacco abuse.  



As per records, the last 2 days, the patient noted flu-like symptoms, pounding 
headache, no

blurred vision, intermittent numbness of the arms and legs, which she has had

in the last few years. Pounding chest pain, no shortness of breath, no cough

symptoms.  Patient also complaining of some abdominal discomfort, achy.  

Constipated last few days.

Patient also complaining of bug bite on the right lower abdomen, worried about 
Lyme disease.

As per report, the patient got aggressive towards home health care

worker taking care of mother.  

Patient brought by police to the ER for evaluation.  

Currently, headache improving, chest pain resolved after analgesics given at 
the ER.

 

MEDICAL HISTORY:  As above.

 

SURGERIES:  As above.

 

HOME MEDICATIONS:  None.

 

ALLERGIES:  No known drug allergies.

 

FAMILY HISTORY:  Family history of heart disease.

 

PERSONAL AND SOCIAL HISTORY:  Intermittent tobacco abuse, few cigarettes a

day.  No chronic intake of alcoholic beverages.  Unemployed.

 

REVIEW OF SYSTEMS:  As per HPI.  All 10 systems reviewed.  All other ROS

negative.

 

PHYSICAL EXAMINATION:

VITAL SIGNS:  Blood pressure was noted to be 130/80, pulse rate 76, RR 16,

temperature 36.9, sats 94 on room air.

GENERAL:  Noted to be obese, slightly anxious, no respiratory distress. 

Looks older for stated age.

SKIN:  Normal color, warm.

HEENT:  Pale palpebral conjunctivae.  No ptosis.  Dry mucosa.

NECK:  Supple.  Short.

CHEST:  Clear to auscultation.  No tenderness.

HEART:  Regular rate and rhythm.  No murmur.

ABDOMEN:  Pink indurated plaque, RLQ;  Soft, nontender.

EXTREMITIES:  No edema.  No tenderness.  No gross deformities.

NEUROLOGIC:  Coherent.  No gross focality.

 

LABORATORY DATA:  Hemoglobin was noted to be 14.7, hematocrit 40, white cells

11.2, platelets 270.  Sodium 139, potassium 4.1, chloride 103, CO2 28, BUN

10, creatinine 0.6, glucose 107.  Troponin noted to be 0.015.  

Lyme screen, flu swab negative



CT head, no acute pathology.  

Chest x-ray showed no acute process.  

CTA:  No acute pathology.  

CT abdomen and pelvis  leiomyoma, renal cyst



UA clear.

Tox screen, alcohol level negative 



EKG as per my interpretation, normal sinus rhythm, no ischemia.

 

ASSESSMENT:

1.  Atypical chest pain, possibly from anxiety, viral illness.

2.  Headache secondary to viral illness.

3.  Complex renal cyst on CT.

4.  Tobacco abuse.

5.  Upper extremity/lower extremity numbness likely peripheral neuropathy 
symptoms

Recurrent over the years as per patient/family account

6.  Constipation

 

PLAN:  

Observation 

PCU

2D echo RE chest pain. 

Supportive measures for viral illness. 

Outpatient Urology followup for renal cyst.

Bowel regimen

PT, OT eval.

Patient counseled to stop smoking.

Neuropathy workup, outpatient Neurology opinion

Home tomorrow morning if 2D echo normal and patient feeling better.

DVT prophylaxis, Lovenox subcutaneous 

DNR as patient wishes. 

 

 

MTDD

## 2018-01-20 NOTE — DISCHARGE SUMMARY
Discharge Summary


Date of Service


Jan 20, 2018.





Discharge Summary


Admission Date:


Jan 19, 2018 at 22:04


Discharge Date:  Jan 20, 2018


Discharge Disposition:  Home with services


Principal Diagnosis:


Atypical Chest pain


Procedures:


CT ABD:


1. No acute intra-abdominal or intrapelvic abnormality identified.


2. 5.1 x 5.5 cm ovoid lesion of the left aspect of the uterus suggests uterine


leiomyoma. 


3. Intermediate attenuating ovoid 2.5 cm lesion of the medial interpolar left


kidney may reflect a mildly complex renal cyst and could be further


characterized with a renal ultrasound.


4. Normal appendix.





CTA:


1. No acute intrathoracic abnormality identified, specifically no acute aortic


pathology or evidence of pulmonary thromboembolic disease. Study is limited as


above secondary to contrast bolus timing.


2. No pathologic adenopathy or lobar airspace consolidation.





CT head:


:No acute intracranial abnormality.


Consultations:


None


Pending Studies/Follow-Up:


Follow up with Dr. Daigle for primary care at [facility] 

on 1/25/18 at 12:55pm


Follow up with your OBGYN (For Uterine Fibroid) and Urologist (For Renal Cyst) 

as advised


Seek immediate medical attention if your symptoms reoccur or worsen





Medication Reconciliation


New Medications:  


Polyethylene (Miralax) 17 Gm Pow


17 GM PO DAILY PRN for Constipation for 7 Days, #7 EA





Sennosides-Docusate Sodium (Senokot S) 1 Tab Tab


1 TAB PO DAILY for 7 Days, #7 TAB











Admission Information


HPI (per Admitting provider):


CHIEF COMPLAINT:  Flu-like symptoms as per records.


 


HISTORY OF PRESENT ILLNESS: 





History obtained from patient, family, records.


 


Medical history significant for intermittent tobacco abuse.  





As per records, the last 2 days, the patient noted flu-like symptoms, pounding 

headache, no


blurred vision, intermittent numbness of the arms and legs, which she has had


in the last few years. Pounding chest pain, no shortness of breath, no cough


symptoms.  Patient also complaining of some abdominal discomfort, achy.  


Constipated last few days.


Patient also complaining of bug bite on the right lower abdomen, worried about 

Lyme disease.


As per report, the patient got aggressive towards home health care


worker taking care of mother.  


Patient brought by police to the ER for evaluation.  


Currently, headache improving, chest pain resolved after analgesics given at 

the ER.


Physical Exam (per Admitting):


PHYSICAL EXAMINATION:


VITAL SIGNS:  Blood pressure was noted to be 130/80, pulse rate 76, RR 16,


temperature 36.9, sats 94 on room air.


GENERAL:  Noted to be obese, slightly anxious, no respiratory distress. 


Looks older for stated age.


SKIN:  Normal color, warm.


HEENT:  Pale palpebral conjunctivae.  No ptosis.  Dry mucosa.


NECK:  Supple.  Short.


CHEST:  Clear to auscultation.  No tenderness.


HEART:  Regular rate and rhythm.  No murmur.


ABDOMEN:  Pink indurated plaque, RLQ;  Soft, nontender.


EXTREMITIES:  No edema.  No tenderness.  No gross deformities.


NEUROLOGIC:  Coherent.  No gross focality.





Hospital Course





Atypical Chest pain


Likely related to anxiety (Has having court issues per patient)


Troponin X 2: Negative


EKG: no signs of acute Ischemia


CTA: No acute intrathoracic abnormality, No PE, No pathologic adenopathy or 

lobar airspace consolidation.


Chest pain resolved


ECHO:


 *  Normal LV chamber size and wall thickness.


  *  Normal LV systolic function, EF 60-65%.


  *  No segmental left ventricular wall motion abnormalities are noted.


  *  Grade I diastolic dysfunction.


  *  No significant valvular pathology.








Headache


Likely related to anxiety


CT head:No acute intracranial abnormality.








Incidental finding of renal cyst 


No hematuria on UA


Denies flank pain


Follow up with Urology as outpatient








Tobacco abuse.


 to quit smoking








Chronic UE and LE numbness/tingling


Likely peripheral neuropathy


Follow up with PCP/Neurology as outpatient








Constipation


Continue bowel regimen








Uterine leiomyoma:


Follow up with OBGYN as outpatient 








DVT px:


SQ Lovenox 





Code Status: 


DNR 





Disposition:


Plan to discharge home today





Follow up with Dr. Daigle for primary care at [facility] 

on 1/25/18 at 12:55pm


Follow up with your OBGYN (For Uterine Fibroid) and Urologist (For Renal Cyst) 

as advised


Seek immediate medical attention if your symptoms reoccur or worsen


Total time spent on discharge = 


This includes examination of the patient, discharge planning, medication 

reconciliation, and communication with other providers.





Discharge Instructions


Discharge Instructions


Date of Service


Jan 20, 2018.





Admission


Reason for Admission:  Chest Pain





Discharge


Discharge Diagnosis / Problem:  Atypical Chest pain





Discharge Goals


Goal(s):  Decrease discomfort, Improve function





Activity Recommendations


Activity Limitations:  resume your previous activity


Exercise/Sports Limitations:  as tolerated





.





Instructions / Follow-Up


Instructions / Follow-Up


Follow up with Dr. Daigle for primary care at [facility] 

on 1/25/18 at 12:55pm


Follow up with your OBGYN (For Uterine Fibroid) and Urologist (For Renal Cyst) 

as advised


Seek immediate medical attention if your symptoms reoccur or worsen





Current Hospital Diet


Patient's current hospital diet: AHA Diet (Heart Healthy)





Discharge Diet


Recommended Diet:  AHA Diet (Heart Healthy)





Pending Studies


Studies pending at discharge:  no





Medical Emergencies








.


Who to Call and When:





Medical Emergencies:  If at any time you feel your situation is an emergency, 

please call 911 immediately.





.





Non-Emergent Contact


Non-Emergency issues call your:  Primary Care Provider


Call Non-Emergent contact if:  you have a fever, your pain is not controlled, 

your pain is worsening, your pain is unusual for you, your pain is concerning 

you, you have any medication questions


Seek immediate medical attention if your symptoms reoccur or worsen


.


.








"Provider Documentation" section prepared by Kali Kumar.








.





VTE Core Measure


Inpt VTE Proph given/why not?:  Enoxaparin (Lovenox)SQ











<Electronically signed by Kali Kumar MD>





  Signed:  01/20/18 8026


  Signed:  





The status of this report is Signed


* If report status is Draft, the document has not been finalized by the 

responsible provider.

## 2018-01-20 NOTE — PROGRESS NOTE
Internal Med Progress Note


Date of Service:


Jan 20, 2018.


Provider Documentation:





SUBJECTIVE:





Seen and examined at bedside


States feeling much better today


Chest pain, headache, abdominal pain resolved


Denies SOB


Has chronic tingling/numbness of fingers    


No other complaints








OBJECTIVE:





Vital Signs-as noted below





Physical Exam:


General Appearance:Obese, no apparent distress


Head:  normocephalic, Atraumatic 


Eyes:  normal inspection, EOMI, PERRL


Neck:  supple, Trachea midline


Respiratory/Chest: Normal breath sounds, CTA


Cardiovascular: S1, S2, No murmur


Abdomen/GI:Soft, Non tender, Bowel sounds present


Extremities/Musculoskelatal:normal inspection, no edema 


Neurologic/Psych:AAOX3, grossly no focal neurological deficits 


Skin:  normal color, warm





Lab data as noted below.


ASSESSMENT & PLAN:





Atypical Chest pain


Likely related to anxiety (Has having court issues per patient)


Troponin X 2: Negative


EKG: no signs of acute Ischemia


CTA: No acute intrathoracic abnormality, No PE, No pathologic adenopathy or 

lobar airspace consolidation.


Chest pain resolved


ECHO:


 *  Normal LV chamber size and wall thickness.


  *  Normal LV systolic function, EF 60-65%.


  *  No segmental left ventricular wall motion abnormalities are noted.


  *  Grade I diastolic dysfunction.


  *  No significant valvular pathology.








Headache


Likely related to anxiety


CT head:No acute intracranial abnormality.








Incidental finding of renal cyst 


No hematuria on UA


Denies flank pain


Follow up with Urology as outpatient








Tobacco abuse.


 to quit smoking








Chronic UE and LE numbness/tingling


Likely peripheral neuropathy


Follow up with PCP/Neurology as outpatient








Constipation


Continue bowel regimen








Uterine leiomyoma:


Follow up with OBGYN as outpatient 








DVT px:


SQ Lovenox 





Code Status: 


DNR 





Disposition:


Plan to discharge home today





Follow up with Dr. Daigle for primary care at [facility] 

on 1/25/18 at 12:55pm


Follow up with your OBGYN (For Uterine Fibroid) and Urologist (For Renal Cyst) 

as advised


Seek immediate medical attention if your symptoms reoccur or worsen


Vital Signs:











  Date Time  Temp Pulse Resp B/P (MAP) Pulse Ox O2 Delivery O2 Flow Rate FiO2


 


1/20/18 12:31 36.7 82 18 115/74 (88) 90 Room Air  


 


1/20/18 08:44 36.8 75 18 120/82 (95) 96   


 


1/20/18 04:29 36.5 95 22 116/79 (91) 94 Room Air  


 


1/20/18 04:00      Room Air  


 


1/19/18 23:59      Room Air  


 


1/19/18 23:48 36.5 86 22 119/77 (91) 93 Room Air  


 


1/19/18 22:02 36.6 104 22 169/97 95 Room Air  


 


1/19/18 21:11  87 16 137/92 96 Room Air  


 


1/19/18 20:00  89 16 139/92 97 Room Air  


 


1/19/18 19:28  97 16 105/77 95 Room Air  


 


1/19/18 18:51  97 16 109/89 94 Room Air  


 


1/19/18 17:44  95 16 126/88 93 Room Air  


 


1/19/18 16:37 36.9 96 16 103/85 94 Room Air  








Lab Results:





Results Past 24 Hours








Test


  1/19/18


16:51 1/19/18


16:54 1/19/18


16:58 1/19/18


19:55 Range/Units


 


 


White Blood Count 11.22    4.8-10.8  K/uL


 


Red Blood Count 4.76    4.2-5.4  M/uL


 


Hemoglobin 14.7    12.0-16.0  g/dL


 


Hematocrit 43.0    37-47  %


 


Mean Corpuscular Volume 90.3      fL


 


Mean Corpuscular Hemoglobin 30.9    25-34  pg


 


Mean Corpuscular Hemoglobin


Concent 34.2


  


  


  


  32-36  g/dl


 


 


Platelet Count 273    130-400  K/uL


 


Mean Platelet Volume 10.2    7.4-10.4  fL


 


Neutrophils (%) (Auto) 77.3     %


 


Lymphocytes (%) (Auto) 15.5     %


 


Monocytes (%) (Auto) 5.3     %


 


Eosinophils (%) (Auto) 1.3     %


 


Basophils (%) (Auto) 0.3     %


 


Neutrophils # (Auto) 8.67    1.4-6.5  K/uL


 


Lymphocytes # (Auto) 1.74    1.2-3.4  K/uL


 


Monocytes # (Auto) 0.60    0.11-0.59  K/uL


 


Eosinophils # (Auto) 0.15    0-0.5  K/uL


 


Basophils # (Auto) 0.03    0-0.2  K/uL


 


RDW Standard Deviation 41.9    36.4-46.3  fL


 


RDW Coefficient of Variation 12.7    11.5-14.5  %


 


Immature Granulocyte % (Auto) 0.3     %


 


Immature Granulocyte # (Auto) 0.03    0.00-0.02  K/uL


 


Sodium Level 139    136-145  mmol/L


 


Potassium Level 4.4    3.5-5.1  mmol/L


 


Chloride Level 103      mmol/L


 


Carbon Dioxide Level 28    21-32  mmol/L


 


Anion Gap 7.0    3-11  mmol/L


 


Blood Urea Nitrogen 10    7-18  mg/dl


 


Creatinine


  0.69


  


  


  


  0.60-1.20


mg/dl


 


Est Creatinine Clear Calc


Drug Dose 110.8


  


  


  


   ml/min


 


 


Estimated GFR (


American) 109.7


  


  


  


   


 


 


Estimated GFR (Non-


American 94.6


  


  


  


   


 


 


BUN/Creatinine Ratio 14.0    10-20  


 


Random Glucose 107    70-99  mg/dl


 


Calcium Level 9.2    8.5-10.1  mg/dl


 


Magnesium Level 1.9    1.8-2.4  mg/dl


 


Total Bilirubin 0.3    0.2-1  mg/dl


 


Aspartate Amino Transf


(AST/SGOT) 12


  


  


  


  15-37  U/L


 


 


Alanine Aminotransferase


(ALT/SGPT) 20


  


  


  


  12-78  U/L


 


 


Alkaline Phosphatase 77      U/L


 


Troponin I < 0.015    0-0.045  ng/ml


 


Total Protein 7.8    6.4-8.2  gm/dl


 


Albumin 4.2    3.4-5.0  gm/dl


 


Globulin 3.6    2.5-4.0  gm/dl


 


Albumin/Globulin Ratio 1.2    0.9-2  


 


Thyroid Stimulating Hormone


(TSH) 1.340


  


  


  


  0.300-4.500


uIu/ml


 


Salicylates Level 7.2    2.8-20  mg/dl


 


Acetaminophen Level < 2    10-30  ug/ml


 


Ethyl Alcohol mg/dL < 3.0    0-3  mg/dl


 


Lyme Disease IgG Antibody NEG    NEG  


 


Lyme Disease IgM Antibody NEG    NEG  


 


Lipase  123     U/L


 


Influenza Type A Antigen   Neg for Influ A  NEG  


 


Influenza Type B Antigen   Neg for Influ B  NEG  


 


Urine Color    YELLOW  


 


Urine Appearance    CLEAR CLEAR  


 


Urine pH    5.5 4.5-7.5  


 


Urine Specific Gravity    1.015 1.000-1.030  


 


Urine Protein    NEG NEG  


 


Urine Glucose (UA)    NEG NEG  


 


Urine Ketones    NEG NEG  


 


Urine Occult Blood    NEG NEG  


 


Urine Nitrite    NEG NEG  


 


Urine Bilirubin    NEG NEG  


 


Urine Urobilinogen    NEG NEG  


 


Urine Leukocyte Esterase    NEG NEG  


 


Test


  1/19/18


20:10 1/20/18


06:19 


  


  Range/Units


 


 


Urine Opiates Screen NEG    NEG  


 


Urine Methadone, Qualitative NEG    NEG  


 


Urine Barbiturates NEG    NEG  


 


Urine Phencyclidine (PCP)


Level NEG


  


  


  


  NEG  


 


 


Ur


Amphetamine/Methamphetamine NEG


  


  


  


  NEG  


 


 


MDMA (Ecstasy) Screen NEG    NEG  


 


Urine Benzodiazepines Screen NEG    NEG  


 


Urine Cocaine Metabolite NEG    NEG  


 


Urine Marijuana (THC) NEG    NEG  


 


White Blood Count  7.95   4.8-10.8  K/uL


 


Red Blood Count  4.45   4.2-5.4  M/uL


 


Hemoglobin  13.8   12.0-16.0  g/dL


 


Hematocrit  40.9   37-47  %


 


Mean Corpuscular Volume  91.9     fL


 


Mean Corpuscular Hemoglobin  31.0   25-34  pg


 


Mean Corpuscular Hemoglobin


Concent 


  33.7


  


  


  32-36  g/dl


 


 


Platelet Count  244   130-400  K/uL


 


Mean Platelet Volume  10.1   7.4-10.4  fL


 


Neutrophils (%) (Auto)  64.3    %


 


Lymphocytes (%) (Auto)  26.2    %


 


Monocytes (%) (Auto)  6.0    %


 


Eosinophils (%) (Auto)  2.9    %


 


Basophils (%) (Auto)  0.3    %


 


Neutrophils # (Auto)  5.12   1.4-6.5  K/uL


 


Lymphocytes # (Auto)  2.08   1.2-3.4  K/uL


 


Monocytes # (Auto)  0.48   0.11-0.59  K/uL


 


Eosinophils # (Auto)  0.23   0-0.5  K/uL


 


Basophils # (Auto)  0.02   0-0.2  K/uL


 


RDW Standard Deviation  43.8   36.4-46.3  fL


 


RDW Coefficient of Variation  13.0   11.5-14.5  %


 


Immature Granulocyte % (Auto)  0.3    %


 


Immature Granulocyte # (Auto)  0.02   0.00-0.02  K/uL


 


Prothrombin Time


  


  10.6


  


  


  9.0-12.0


SECONDS


 


Prothromb Time International


Ratio 


  1.0


  


  


  0.9-1.1  


 


 


Troponin I  < 0.015   0-0.045  ng/ml


 


Vitamin B12 Level  376   211-911  pg/mL


 


Folate  15.48   >5.38  ng/mL

## 2018-01-28 NOTE — EMERGENCY ROOM VISIT NOTE
History


Report prepared by Dhaval:  Hood Porras


Under the Supervision of:  Dr. Fer Sanchez D.O.


First contact with patient:  08:11


Chief Complaint:  ILLNESS


Stated Complaint:  DIZZINESS/CHEST PAIN/ARM NUMBNESS





History of Present Illness


The patient is a 62 year old female who presents to the Emergency Room with 

complaints of numbness in her hands, arms, and legs that occurred just prior to 

arrival. She complains of a pounding headache, chest pain, and nausea. She 

states she had the flu last week and has some recent bug bites. Of note, she 

states this feels similar to her previous episodes.





   Source of History:  patient


   Onset:  PTA


   Position:  other (global)


   Timing:  constant


   Associated Symptoms:  + headache (pounding), + chest pain, + nausea, + 

numbness





Review of Systems


See HPI for pertinent positives & negatives. A total of 10 systems reviewed and 

were otherwise negative.





Past Medical & Surgical


Medical Problems:


(1) Altered mental status


(2) Chest pain


(3) No Known Active Medical Problems








Family History





FH: HTN (hypertension)


FH: cancer


FH: diabetes mellitus


FH: heart disease


FH: lung disease





Social History


Smoking Status:  Never Smoker


Alcohol Use:  occasionally


Drug Use:  none


Housing Status:  lives with family


Occupation Status:  employed





Current/Historical Medications


Scheduled


Aspirin (Aspirin), 81 MG PO DAILY





Allergies


Coded Allergies:  


     No Known Allergies (Unverified , 1/28/18)





Physical Exam


Vital Signs











  Date Time  Temp Pulse Resp B/P (MAP) Pulse Ox O2 Delivery O2 Flow Rate FiO2


 


1/28/18 10:10  67 16 126/60 96 Room Air  


 


1/28/18 09:17  67      


 


1/28/18 08:10 36.6 82 16 137/111 96 Room Air  











Physical Exam


CONSTITUTIONAL/VITAL SIGNS: Reviewed / noted above.


GENERAL: Non-toxic in appearance. 


INTEGUMENTARY: Warm, dry, and Pink.


HEAD: Normocephalic.


EYES: without scleral icterus or trauma.


ENT/OROPHARYNX: clear and moist.


LYMPHADENOPATHY/NECK: Is supple without lymphadenopathy or meningismus.


RESPIRATORY: Lungs clear and equal.


CARDIOVASCULAR: Regular rate and rhythm.


GI/ABDOMEN: Soft and nontender. No organomegaly or pulsatile mass. No rebound 

or guarding. Normal bowel sounds.


EXTREMITIES: Warm and well perfused.


BACK: No CVA tenderness.


NEUROLOGICAL: Intact without focal deficits. 


PSYCHIATRIC: normal affect.


MUSCULOSKELETAL: Normally developed with good muscle tone.





Medical Decision & Procedures


ER Provider


Diagnostic Interpretation:


Radiology results as stated below per my review and radiologist interpretation: 





CHEST ONE VIEW PORTABLE





CLINICAL HISTORY: Altered mental status. Weakness.    





COMPARISON STUDY:  Chest radiograph and chest CT January 19, 2018.





FINDINGS: Lung volumes are normal. No pneumothorax or pleural effusion is noted.


There is no consolidation to suggest pneumonia. There is no evidence for


pulmonary edema. Cardiomediastinal silhouette is stable. 





IMPRESSION:  No acute cardiopulmonary findings. 








Electronically signed by:  Rishabh Mcclain M.D.


1/28/2018 8:30 AM





Dictated Date/Time:  1/28/2018 8:29 AM





Laboratory Results


1/28/18 08:15








Red Blood Count 4.60, Mean Corpuscular Volume 91.1, Mean Corpuscular Hemoglobin 

31.1, Mean Corpuscular Hemoglobin Concent 34.1, Mean Platelet Volume 9.9, 

Neutrophils (%) (Auto) 65.8, Lymphocytes (%) (Auto) 24.5, Monocytes (%) (Auto) 

5.8, Eosinophils (%) (Auto) 3.6, Basophils (%) (Auto) 0.2, Neutrophils # (Auto) 

5.34, Lymphocytes # (Auto) 1.99, Monocytes # (Auto) 0.47, Eosinophils # (Auto) 

0.29, Basophils # (Auto) 0.02





1/28/18 08:15

















Test


  1/28/18


08:15


 


White Blood Count


  8.12 K/uL


(4.8-10.8)


 


Red Blood Count


  4.60 M/uL


(4.2-5.4)


 


Hemoglobin


  14.3 g/dL


(12.0-16.0)


 


Hematocrit 41.9 % (37-47) 


 


Mean Corpuscular Volume


  91.1 fL


()


 


Mean Corpuscular Hemoglobin


  31.1 pg


(25-34)


 


Mean Corpuscular Hemoglobin


Concent 34.1 g/dl


(32-36)


 


Platelet Count


  254 K/uL


(130-400)


 


Mean Platelet Volume


  9.9 fL


(7.4-10.4)


 


Neutrophils (%) (Auto) 65.8 % 


 


Lymphocytes (%) (Auto) 24.5 % 


 


Monocytes (%) (Auto) 5.8 % 


 


Eosinophils (%) (Auto) 3.6 % 


 


Basophils (%) (Auto) 0.2 % 


 


Neutrophils # (Auto)


  5.34 K/uL


(1.4-6.5)


 


Lymphocytes # (Auto)


  1.99 K/uL


(1.2-3.4)


 


Monocytes # (Auto)


  0.47 K/uL


(0.11-0.59)


 


Eosinophils # (Auto)


  0.29 K/uL


(0-0.5)


 


Basophils # (Auto)


  0.02 K/uL


(0-0.2)


 


RDW Standard Deviation


  42.2 fL


(36.4-46.3)


 


RDW Coefficient of Variation


  12.8 %


(11.5-14.5)


 


Immature Granulocyte % (Auto) 0.1 % 


 


Immature Granulocyte # (Auto)


  0.01 K/uL


(0.00-0.02)


 


Prothrombin Time


  10.4 SECONDS


(9.0-12.0)


 


Prothromb Time International


Ratio 1.0 (0.9-1.1) 


 


 


Activated Partial


Thromboplast Time 26.8 SECONDS


(21.0-31.0)


 


Partial Thromboplastin Ratio 1.0 


 


Anion Gap


  5.0 mmol/L


(3-11)


 


Est Creatinine Clear Calc


Drug Dose 91.1 ml/min 


 


 


Estimated GFR (


American) 91.5 


 


 


Estimated GFR (Non-


American 78.9 


 


 


BUN/Creatinine Ratio 9.9 (10-20) 


 


Calcium Level


  9.3 mg/dl


(8.5-10.1)


 


Magnesium Level


  1.9 mg/dl


(1.8-2.4)


 


Total Bilirubin


  0.3 mg/dl


(0.2-1)


 


Direct Bilirubin


  < 0.1 mg/dl


(0-0.2)


 


Aspartate Amino Transf


(AST/SGOT) 12 U/L (15-37) 


 


 


Alanine Aminotransferase


(ALT/SGPT) 21 U/L (12-78) 


 


 


Alkaline Phosphatase


  76 U/L


()


 


Total Creatine Kinase


  64 U/L


()


 


Creatine Kinase MB


  1.2 ng/ml


(0.5-3.6)


 


Creatine Kinase MB Ratio 1.9 (0-3.0) 


 


Troponin I


  < 0.015 ng/ml


(0-0.045)


 


Total Protein


  7.8 gm/dl


(6.4-8.2)


 


Albumin


  4.1 gm/dl


(3.4-5.0)


 


Lipase


  154 U/L


()


 


Thyroid Stimulating Hormone


(TSH) 2.800 uIu/ml


(0.300-4.500)





Laboratory results as stated above per my review.





Medications Administered











 Medications


  (Trade)  Dose


 Ordered  Sig/Katie


 Route  Start Time


 Stop Time Status Last Admin


Dose Admin


 


 Acetaminophen


  (Tylenol Tab)  1,000 mg  NOW  STAT


 PO  1/28/18 08:45


 1/28/18 08:46 DC 1/28/18 09:02


1,000 MG











ECG


Indication:  chest pain


Rate (beats per minute):  72


Rhythm:  normal sinus


Findings:  no ectopy, other (no acute injury)





ED Course


0811: Previous medical records were reviewed. The patient was evaluated in room 

A2. A complete history and physical examination was performed.





0845: Tylenol Tab 1,000 mg PO.





1002: On reevaluation, the patient is doing well. I discussed the results and 

findings with the patient. She verbalized agreement of the treatment plan. She 

was discharged home.





Medical Decision


Differential includes acute coronary syndrome, myocardial infarction, CVA, TIA, 

anemia, infection, pneumonia, UTI, pyelonephritis, poor nutrition, dehydration, 

electrolyte disturbance,hypoglycemia.





This is a 62-year-old female who presents to the ED with a chief complaint of 

numbness in her feet, hands and arms.  She also reported being lightheaded and 

having a headache.  The patient states that she had similar symptoms last week 

for which she was admitted and worked up.  She was discharged with a diagnosis 

of atypical chest pain.  The patient is currently in no distress.  She is awake

, alert and oriented.  Her exam is unremarkable.  She has no focal weakness.  

She does not appear to be in any distress.  Initial heart rate was documented 

as slightly tachycardic with a heart rate of 117.  When I saw her it was around 

100.  The patient's blood work reveals a normal CBC, unremarkable chemistry 

panel, troponin was negative, EKG shows a normal sinus rhythm at a rate of 72 

and a chest x-ray did not show acute process.  The patient was given Tylenol 

for headache.  She was reassessed.  She was feeling better.  She feels that her 

symptoms might be stress related.  He is felt to be stable for discharge and 

outpatient follow-up.





Medication Reconcilliation


Current Medication List:  was personally reviewed by me





Blood Pressure Screening


Patient's blood pressure:  Normal blood pressure


Blood pressure disposition:  Did not require urgent referral





Impression





 Primary Impression:  


 Stress reaction


 Additional Impression:  


 Paresthesia





Scribe Attestation


The scribe's documentation has been prepared under my direction and personally 

reviewed by me in its entirety. I confirm that the note above accurately 

reflects all work, treatment, procedures, and medical decision making performed 

by me.





Departure Information


Dispostion


Home / Self-Care





Referrals


No Doctor, Assigned (PCP)





Patient Instructions


My [facility]





Additional Instructions





Follow-up with your doctor for further care and evaluation in 1-2 days.  Return 

to the emergency department for worsening or new symptoms or any concerns.


You have been examined and treated today on an emergency basis only. This is 

not a substitute for, or an effort to provide, complete comprehensive medical 

care. It is impossible to recognize and treat all injuries or illnesses in a 

single emergency department visit. It is therefore important that you follow up 

closely with your doctor.  Call as soon as possible for an appointment.





Problem Qualifiers

## 2018-01-28 NOTE — DIAGNOSTIC IMAGING REPORT
CHEST ONE VIEW PORTABLE



CLINICAL HISTORY: Altered mental status. Weakness.    



COMPARISON STUDY:  Chest radiograph and chest CT January 19, 2018.



FINDINGS: Lung volumes are normal. No pneumothorax or pleural effusion is noted.

There is no consolidation to suggest pneumonia. There is no evidence for

pulmonary edema. Cardiomediastinal silhouette is stable. 



IMPRESSION:  No acute cardiopulmonary findings. 









Electronically signed by:  Rishabh Mcclain M.D.

1/28/2018 8:30 AM



Dictated Date/Time:  1/28/2018 8:29 AM

## 2018-04-06 NOTE — DIAGNOSTIC IMAGING REPORT
CT OF THE HEAD WITHOUT CONTRAST



CLINICAL HISTORY: blurred vision    



COMPARISON STUDY:  Head CT January 19, 2018. 



CT DOSE: 537.48 mGy.cm



TECHNIQUE: Helical axial images of the head were obtained without IV contrast.

Automated exposure control was utilized for the study.  A dose lowering

technique was utilized adhering to the principles of ALARA.





FINDINGS: No acute intracranial hemorrhage, midline shift or mass effect is

present. Ventricular system is normal. Basilar cisterns are patent. There are

nodular axial collections. Gray-white differentiation is maintained. There are

no findings to suggest acute dural sinus thrombosis or acute territorial

infarct. There are no significant calvarial abnormalities.



IMPRESSION:  No acute intracranial findings. 







Electronically signed by:  Rishabh Mcclain M.D.

4/6/2018 11:00 AM



Dictated Date/Time:  4/6/2018 10:58 AM

## 2018-04-06 NOTE — EMERGENCY ROOM VISIT NOTE
History


First contact with patient:  09:38


Chief Complaint:  EYE ASSESSMENT


Stated Complaint:  EYES





History of Present Illness


The patient is a 62 year old female who presents to the Emergency Room via 

private vehicle with complaints of "eyes".  The patient states that she has 

been experiencing blurred vision for the past few weeks but notes that it has 

been about 1-2 months.  She states that she has had a headache since mid March.

  Light sensitivity for years.  Visual change occurred 1.5 years ago.  She 

denies any follow-up for eyes.  She was seen at [facility], and told to use 

warm compresses and astringent eyedrops.  She also states that she had a tick 

bite back on Sunday which was removed.  She states that she has had previous 

testing for Lyme disease just before the holiday.  She states that she smokes a 

gets couple times a week, and occasional alcohol use.  No drug use.  She states 

that there is a family history of blindness early in life, diabetes and 

hypertension.





Review of Systems


A complete 10-point Review of Systems was discussed with the patient, with 

pertinent positives and negatives listed in the History of Present Illness. All 

remaining Review of Systems questions can be considered negative unless 

otherwise specified.





Past Medical/Surgical History


Medical Problems:


(1) Altered mental status


(2) Chest pain


(3) No Known Active Medical Problems








Family History





FH: HTN (hypertension)


FH: cancer


FH: diabetes mellitus


FH: heart disease


FH: lung disease





Social History


Smoking Status:  Current Some Day Smoker


Alcohol Use:  occasionally


Drug Use:  none


Housing Status:  lives with family


Occupation Status:  employed





Current/Historical Medications


Scheduled


Aspirin (Aspirin Ec), 325 MG PO 3XWK





Miscellaneous Medications


Eye Drops (Eye Drops)





Physical Exam


Vital Signs











  Date Time  Temp Pulse Resp B/P (MAP) Pulse Ox O2 Delivery O2 Flow Rate FiO2


 


4/6/18 11:06  75 20 127/85 95   


 


4/6/18 09:07 36.7 75 18 138/79 95 Room Air  








Right Eye Acuity:  20/30


Left Eye Acuity:  20/20





Physical Exam


VITAL SIGNS - Vital signs and nursing notes were reviewed.  Stable.  

Hypertensive.


GENERAL -62-year-old female appearing her stated age who is in no acute 

distress. Communicates well with provider and answers questions appropriately.


SKIN - Without rashes.  No meningeal petechial rash.  Negative Parker sign.


HEAD - NC/AT.


EYES - PERRL with EOMI bilaterally. Sclera anicteric.  There is mild erythema 

to the eyes.  No periorbital edema.  No periorbital erythema.  No rashes.


EARS - No deformities of external structures noted on gross examination 

bilaterally.  External auditory canals without discharge or otorrhea. Tympanic 

membranes pearly gray without retraction or bulging. No fluid or purulent 

material visualized behind the TM. Handle of malleus, umbo, cone of light, pars 

tensa/flaccid all easily visualized.


NOSE - Midline and without cyanosis. No epistaxis or purulent drainage noted. 

Septum midline without deviation or septal hematoma noted.


MOUTH/OROPHARYNX - Without perioral cyanosis. Buccal mucosa pink and moist and 

without leukoplakia. Tongue midline with equal elevation of palate bilaterally. 

No tonsillar hypertrophy, erythema, or exudates noted. Fair dentition noted.


NECK - Neck with FROM. Supple to palpation. No lymphadenopathy noted. No nuchal 

rigidity.


LUNGS - Chest wall symmetric without accessory muscle use, intercostals 

retractions, or central cyanosis. Normal vesicular breath sounds CTA B/L. No 

wheezes, rales, or rhonchi appreciated.


CARDIAC - RRR with S1/S2. No murmur, rubs, or gallops appreciated. 


EXTREMITIES -   +5/5 strength noted in UE/LE bilaterally.


NEUROLOGIC - Cranial nerves II through XII grossly intact. Sensory intact to 

light touch throughout. 


PSYCH - A&O, and cooperates fully with examiner. Pt is very pleasant and 

interacts well with examiner.








Slit Lamp Examination was performed of the bilateral eye(s). Alcaine drops were 

applied to the affected eye(s) for proper anesthetization. The affected eye(s) 

were stained with Fluorescein stain to precipitate adequate visualization of 

any conjunctival/scleral excoriations or ulcers. The patient's face was 

comfortably rested on the chin guard of the slit lamp apparatus. The lights 

were dimmed and the affected eye(s) were thoroughly examined under microscopy 

using the blue light.  No uptake was present within the eyes. Additionally, the 

eye(s) were examined under microscopy using the regular light. Close 

examination revealed normal slit-lamp exam. Patient tolerated the procedure 

well and no complications were met.





An Automated Tonometer was utilized to obtain bilateral orbital pressures.  The 

pressures in the LEFT eye were found to be 13, 13 with an average of 13. The 

pressures in the RIGHT eye were found to be 17, 19 with an average of 18. 

Patient tolerated the procedure well and no complications were met.





Medical Decision & Procedures


ER Provider


Diagnostic Interpretation:


CT OF THE HEAD WITHOUT CONTRAST





CLINICAL HISTORY: blurred vision    





COMPARISON STUDY:  Head CT January 19, 2018. 





CT DOSE: 537.48 mGy.cm





TECHNIQUE: Helical axial images of the head were obtained without IV contrast.


Automated exposure control was utilized for the study.  A dose lowering


technique was utilized adhering to the principles of ALARA.








FINDINGS: No acute intracranial hemorrhage, midline shift or mass effect is


present. Ventricular system is normal. Basilar cisterns are patent. There are


nodular axial collections. Gray-white differentiation is maintained. There are


no findings to suggest acute dural sinus thrombosis or acute territorial


infarct. There are no significant calvarial abnormalities.





IMPRESSION:  No acute intracranial findings. 











Electronically signed by:  Rishabh Mcclain M.D.


4/6/2018 11:00 AM





Dictated Date/Time:  4/6/2018 10:58 AM





Laboratory Results


4/6/18 10:15








Red Blood Count 4.61, Mean Corpuscular Volume 92.4, Mean Corpuscular Hemoglobin 

33.0, Mean Corpuscular Hemoglobin Concent 35.7, Mean Platelet Volume 10.2, 

Neutrophils (%) (Auto) 68.5, Lymphocytes (%) (Auto) 24.4, Monocytes (%) (Auto) 

3.8, Eosinophils (%) (Auto) 2.8, Basophils (%) (Auto) 0.3, Neutrophils # (Auto) 

6.12, Lymphocytes # (Auto) 2.18, Monocytes # (Auto) 0.34, Eosinophils # (Auto) 

0.25, Basophils # (Auto) 0.03





4/6/18 10:15

















Test


  4/6/18


10:15


 


White Blood Count


  8.94 K/uL


(4.8-10.8)


 


Red Blood Count


  4.61 M/uL


(4.2-5.4)


 


Hemoglobin


  15.2 g/dL


(12.0-16.0)


 


Hematocrit 42.6 % (37-47) 


 


Mean Corpuscular Volume


  92.4 fL


()


 


Mean Corpuscular Hemoglobin


  33.0 pg


(25-34)


 


Mean Corpuscular Hemoglobin


Concent 35.7 g/dl


(32-36)


 


Platelet Count


  266 K/uL


(130-400)


 


Mean Platelet Volume


  10.2 fL


(7.4-10.4)


 


Neutrophils (%) (Auto) 68.5 % 


 


Lymphocytes (%) (Auto) 24.4 % 


 


Monocytes (%) (Auto) 3.8 % 


 


Eosinophils (%) (Auto) 2.8 % 


 


Basophils (%) (Auto) 0.3 % 


 


Neutrophils # (Auto)


  6.12 K/uL


(1.4-6.5)


 


Lymphocytes # (Auto)


  2.18 K/uL


(1.2-3.4)


 


Monocytes # (Auto)


  0.34 K/uL


(0.11-0.59)


 


Eosinophils # (Auto)


  0.25 K/uL


(0-0.5)


 


Basophils # (Auto)


  0.03 K/uL


(0-0.2)


 


RDW Standard Deviation


  44.7 fL


(36.4-46.3)


 


RDW Coefficient of Variation


  13.3 %


(11.5-14.5)


 


Immature Granulocyte % (Auto) 0.2 % 


 


Immature Granulocyte # (Auto)


  0.02 K/uL


(0.00-0.02)


 


Prothrombin Time


  10.0 SECONDS


(9.0-12.0)


 


Prothromb Time International


Ratio 1.0 (0.9-1.1) 


 


 


Activated Partial


Thromboplast Time 26.6 SECONDS


(21.0-31.0)


 


Partial Thromboplastin Ratio 1.0 


 


Anion Gap


  6.0 mmol/L


(3-11)


 


Est Creatinine Clear Calc


Drug Dose 94.4 ml/min 


 


 


Estimated GFR (


American) 92.9 


 


 


Estimated GFR (Non-


American 80.1 


 


 


BUN/Creatinine Ratio 11.5 (10-20) 


 


Calcium Level


  9.1 mg/dl


(8.5-10.1)


 


Magnesium Level


  2.1 mg/dl


(1.8-2.4)


 


Total Bilirubin


  0.4 mg/dl


(0.2-1)


 


Aspartate Amino Transf


(AST/SGOT) 15 U/L (15-37) 


 


 


Alanine Aminotransferase


(ALT/SGPT) 23 U/L (12-78) 


 


 


Alkaline Phosphatase


  81 U/L


()


 


Total Protein


  8.2 gm/dl


(6.4-8.2)


 


Albumin


  4.6 gm/dl


(3.4-5.0)


 


Globulin


  3.6 gm/dl


(2.5-4.0)


 


Albumin/Globulin Ratio 1.3 (0.9-2) 


 


Thyroid Stimulating Hormone


(TSH) 8.220 uIu/ml


(0.300-4.500)


 


Free Thyroxine


  0.76 ng/dl


(0.80-1.60)


 


Lyme Disease IgG Antibody NEG (NEG) 


 


Lyme Disease IgM Antibody NEG (NEG) 











Medical Decision


Patient was seen and evaluated as above in room C1.  She presents to us today 

with blurred vision times few weeks/possibly a few months, headache since mid 

March, as well as visual changes over the past year and a half.  Review was 

performed of nursing notes and vital signs. After obtaining a thorough history 

and physical examination the above work up was performed.  CT the head was 

obtained and found to be negative.  This was obtained secondary to her blurred 

vision complaint.  Her slit-lamp was negative.  Her pressures were negative for 

acute process.  Blood work was obtained and no significant leukocytosis, anemia

, coagulopathy, or metabolic abnormality noted.  Patient TSH is abnormal at 8.26

, with free T4 being low at 0.76.  Lyme testing negative.  When I went to 

reevaluate the patient she was found in the hallway preparing to leave.  It was 

noted that he must been a misunderstanding and that she thought she was 

discharged.  She was escorted back to her room noting that I would return 

shortly with her paperwork/discharge information.  I went to go check on the 

patient and she was nowhere to be found.  I suspect that she had a left again 

thinking that she was discharged.  She was alert and oriented.  I suspect this 

again likely to be secondary to misunderstanding.





Case was discussed with the attending physician.





In the evaluation and treatment of this patient, the following differential 

diagnoses were considered: Corneal Abrasion, Conjunctivitis, Eye Contusion, 

Globe Injury, Orbital Floor Injury (Blowout Fracture), Corneal Ulcer, Keratitis

, Herpes Zoster Opthalmic, Blepharitis, Orbital Cellulitis, Iritis, Scleritis/

Episcleritis, Uveitis, Temporal Arteritis, Subconjunctival Hemorrhage.








I did call the patient back on April 7, 2018 as the patient had left without 

receiving discharge instructions.  The initial call was placed and her mother 

answered.  Patient was not able to be reached at that time.  I then called back 

around 6:25 PM.  I spoke with the patient's mother who is able to relay the 

information to the patient nearby.  The patient is to follow with her family 

doctor as well as ophthalmology.  She is to have her thyroid checked.  They 

note that because she was not able to retrieve her paperwork, somebody will 

stop by and retrieve this.  They were educated upon worrisome symptoms which to 

return.





Impression





 Primary Impression:  


 Blurry vision, bilateral





Departure Information


Dispostion


Home / Self-Care





Condition


GOOD





Referrals


Walter Daigle D.ALEX (PCP)








Timothy Dodson MD





Patient Instructions


My [facility]





Additional Instructions





You have been treated in the Emergency Department for blurred vision.


.


Please call your family doctor to schedule follow-up and the eye doctor, Dr. Dodson (phone number listed in this paperwork)





Please have your thyroid function rechecked with your family doctor.





For pain control, you can use the following over-the-counter medicines (if >13 yo):





- Regular strength (325mg/tab) Tylenol (acetaminophen) 2 tabs every 4-6 hours 

as needed. Do not exceed 12 tablets in a 24 hour period. Avoid taking more than 

3 grams (3000 mg) of Tylenol per day. This includes any other sources of 

acetaminophen you may take on a regular basis.





- Regular strength (200 mg/tab) Advil (ibuprofen) 1-2 tabs every 4-6 hours as 

needed. Do not exceed a dose of 3200 mg per day.





Avoid rubbing your eyes for the next few days as this can cause irritation. 

Wear sunglasses when outside to help minimize your pain. You should relax in a 

quiet, dark room to help minimize your symptoms.





Return to the emergency department if you develop the following symptoms 

despite treatment course outlined above: blurry vision, loss of vision, fever, 

intractable pain, increased redness, swelling, or purulent discharge.

## 2018-04-16 NOTE — EMERGENCY ROOM VISIT NOTE
History


Report prepared by Ralfibelise:  Kaylynn Joel


Under the Supervision of:  Dr. Fer Sanchez D.O.


First contact with patient:  14:46


Chief Complaint:  ILLNESS


Stated Complaint:  ILLNESS





History of Present Illness


The patient is a 62 year old female who presents to the Emergency Room with 

complaints of persistent abdominal pain since this morning. She rates her 

discomfort as a 6/10 in severity and describes the pain as feeling "crampy". 

She has been nauseous and vomited this morning. She has a fever of 100.2, last 

checked at home and states her last BM was 2 days ago. She notes she has had 

puffy eyes recently and has been short of breath, but admits her shortness of 

breath is chronic.





   Source of History:  patient


   Onset:  this morning


   Position:  abdomen


   Symptom Intensity:  6/10


   Quality:  cramping


   Timing:  other (persistent)


   Associated Symptoms:  + fevers, + SOB, + nausea, + vomiting





Review of Systems


See HPI for pertinent positives & negatives. A total of 10 systems reviewed and 

were otherwise negative.





Past Medical & Surgical


Medical Problems:


(1) Altered mental status


(2) Chest pain








Family History





FH: HTN (hypertension)


FH: cancer


FH: diabetes mellitus


FH: heart disease


FH: lung disease





Social History


Smoking Status:  Current Some Day Smoker


Alcohol Use:  occasionally


Drug Use:  none


Housing Status:  lives with family


Occupation Status:  employed





Current/Historical Medications


Scheduled


Aspirin (Aspirin Ec), 162 MG PO 3XWK


Nitrofurantoin Monohyd Macro (Macrobid), 1 CAP PO BID





Allergies


Coded Allergies:  


     No Known Allergies (Unverified , 1/28/18)





Physical Exam


Vital Signs











  Date Time  Temp Pulse Resp B/P (MAP) Pulse Ox O2 Delivery O2 Flow Rate FiO2


 


4/16/18 16:28  71      


 


4/16/18 15:54  74  135/58 94 Room Air  


 


4/16/18 13:49 36.6 95 16 145/91 92 Room Air  











Physical Exam


CONSTITUTIONAL/VITAL SIGNS: Reviewed / noted above.


GENERAL: Non-toxic in appearance. 


INTEGUMENTARY: Warm, dry, and Pink.


HEAD: Normocephalic.


EYES: without scleral icterus or trauma.


ENT/OROPHARYNX: clear and moist.


LYMPHADENOPATHY/NECK: Is supple without lymphadenopathy or meningismus.


RESPIRATORY: Lungs clear and equal.


CARDIOVASCULAR: Regular rate and rhythm.


GI/ABDOMEN: Soft and nontender. No organomegaly or pulsatile mass. No rebound 

or guarding. Normal bowel sounds.


EXTREMITIES: Warm and well perfused.


BACK: No CVA tenderness.


NEUROLOGICAL: Intact without focal deficits. 


PSYCHIATRIC: normal affect.


MUSCULOSKELETAL: Normally developed with good muscle tone.





Medical Decision & Procedures


ER Provider


Diagnostic Interpretation:


Radiology results as stated below per my review and radiologist interpretation:





KUB





CLINICAL HISTORY: Left lower quadrant abdominal pain. Nausea. Vomiting.    





COMPARISON STUDY:  No previous studies for comparison. 





FINDINGS: There is gas within nondilated large and small bowel loops. There are


no transition zones to indicate bowel obstruction. There are no calcifications


suspicious for renal calculi.





IMPRESSION:  


1. No evidence of pathologic bowel dilatation


2. No urinary tract calculi identified on conventional radiographic imaging 





Electronically signed by:  Oneil Nieves M.D.


4/16/2018 3:51 PM





Laboratory Results


4/16/18 15:15








Red Blood Count 4.84, Mean Corpuscular Volume 90.7, Mean Corpuscular Hemoglobin 

31.8, Mean Corpuscular Hemoglobin Concent 35.1, Mean Platelet Volume 10.1, 

Neutrophils (%) (Auto) 75.7, Lymphocytes (%) (Auto) 18.0, Monocytes (%) (Auto) 

4.6, Eosinophils (%) (Auto) 1.2, Basophils (%) (Auto) 0.2, Neutrophils # (Auto) 

9.29, Lymphocytes # (Auto) 2.21, Monocytes # (Auto) 0.56, Eosinophils # (Auto) 

0.15, Basophils # (Auto) 0.02





4/16/18 15:15

















Test


  4/16/18


15:15 4/16/18


15:25


 


White Blood Count


  12.27 K/uL


(4.8-10.8) 


 


 


Red Blood Count


  4.84 M/uL


(4.2-5.4) 


 


 


Hemoglobin


  15.4 g/dL


(12.0-16.0) 


 


 


Hematocrit 43.9 % (37-47)  


 


Mean Corpuscular Volume


  90.7 fL


() 


 


 


Mean Corpuscular Hemoglobin


  31.8 pg


(25-34) 


 


 


Mean Corpuscular Hemoglobin


Concent 35.1 g/dl


(32-36) 


 


 


Platelet Count


  296 K/uL


(130-400) 


 


 


Mean Platelet Volume


  10.1 fL


(7.4-10.4) 


 


 


Neutrophils (%) (Auto) 75.7 %  


 


Lymphocytes (%) (Auto) 18.0 %  


 


Monocytes (%) (Auto) 4.6 %  


 


Eosinophils (%) (Auto) 1.2 %  


 


Basophils (%) (Auto) 0.2 %  


 


Neutrophils # (Auto)


  9.29 K/uL


(1.4-6.5) 


 


 


Lymphocytes # (Auto)


  2.21 K/uL


(1.2-3.4) 


 


 


Monocytes # (Auto)


  0.56 K/uL


(0.11-0.59) 


 


 


Eosinophils # (Auto)


  0.15 K/uL


(0-0.5) 


 


 


Basophils # (Auto)


  0.02 K/uL


(0-0.2) 


 


 


RDW Standard Deviation


  43.2 fL


(36.4-46.3) 


 


 


RDW Coefficient of Variation


  13.1 %


(11.5-14.5) 


 


 


Immature Granulocyte % (Auto) 0.3 %  


 


Immature Granulocyte # (Auto)


  0.04 K/uL


(0.00-0.02) 


 


 


Urine WBC (Auto)


  10-30 /hpf


(0-5) 


 


 


Urine RBC (Auto) 0-4 /hpf (0-4)  


 


Urine Hyaline Casts (Auto) 1-5 /lpf (0-5)  


 


Urine Epithelial Cells (Auto) >30 /lpf (0-5)  


 


Urine Bacteria (Auto) 1+ (NEG)  


 


Anion Gap


  6.0 mmol/L


(3-11) 


 


 


Est Creatinine Clear Calc


Drug Dose 85.9 ml/min 


  


 


 


Estimated GFR (


American) 83.9 


  


 


 


Estimated GFR (Non-


American 72.4 


  


 


 


BUN/Creatinine Ratio 13.0 (10-20)  


 


Calcium Level


  9.2 mg/dl


(8.5-10.1) 


 


 


Total Bilirubin


  0.5 mg/dl


(0.2-1) 


 


 


Direct Bilirubin


  < 0.1 mg/dl


(0-0.2) 


 


 


Aspartate Amino Transf


(AST/SGOT) 12 U/L (15-37) 


  


 


 


Alanine Aminotransferase


(ALT/SGPT) 21 U/L (12-78) 


  


 


 


Alkaline Phosphatase


  84 U/L


() 


 


 


Total Protein


  8.8 gm/dl


(6.4-8.2) 


 


 


Albumin


  4.7 gm/dl


(3.4-5.0) 


 


 


Lipase


  130 U/L


() 


 





Laboratory results as stated above per my review.





ED Course


1445: Previous medical records were reviewed. The patient was evaluated in room 

A9B. A complete history and physical examination was performed.





1645: On reevaluation, the patient is resting comfortably and feeling well. I 

discussed the results and findings with the patient. She verbalized agreement 

of the treatment plan. The patient was discharged home.





Medical Decision


Differential considered: pancreatitis, hepatitis, acute cholecystitis, AAA,  UTI

, pyelonephritis, kidney stones, appendicitis, diverticulitis, shingles, bowel 

obstruction, mesenteric ischemia, intussusception,hernia, space ovarian torsion.





This is a 62-year-old female who presents to the ED with a chief complaint of 

some crampy abdominal pain.  The patient was seen with the resident.  She has 

some associated nausea and some constipation for 2 days.  The patient is a 

normal physical exam.  Laboratory studies were normal as well as a KUB.  Urine 

was contaminated but suggest possible infection.  She was told the results of 

the test.  She was discharged on Macrobid.





Medication Reconcilliation


Current Medication List:  was personally reviewed by me





Blood Pressure Screening


Patient's blood pressure:  Elevated blood pressure


Blood pressure disposition:  Elevated BP felt to be situational





Impression





 Primary Impression:  


 UTI (urinary tract infection)


 Additional Impression:  


 Abdominal cramping





Scribe Attestation


The scribe's documentation has been prepared under my direction and personally 

reviewed by me in its entirety. I confirm that the note above accurately 

reflects all work, treatment, procedures, and medical decision making performed 

by me.





Departure Information


Prescriptions





Nitrofurantoin Monohyd Macro (MACROBID) 100 Mg Cap


1 CAP PO BID for 3 Days, #6 CAP


   Prov: Yuri Sparks M.D.         4/16/18





Referrals


Walter Daigle D.OAce (PCP)





Patient Instructions


My [facility]





Problem Qualifiers

## 2018-04-16 NOTE — EMERGENCY ROOM VISIT NOTE
History


First contact with patient:  14:46


Chief Complaint:  ILLNESS


Stated Complaint:  ILLNESS





History of Present Illness


The patient is a 62 year old female with hx of heart murmur who presents to the 

Emergency Room with complaints of abdominal pain since this AM. Associated with 

fever of 100.2, nausea/vomiting this AM, constipation (last BM 2 days ago), 

puffy eyes (chronic and not coming down), sob (chronic). Denies chest pain and 

dysuria. 





Pt reports being with the [employer] police all day as mother does not want 

her living with her anymore.





Review of Systems


see below





   Constitutional:  + fever


   Respiratory:  + shortness of breath (chronic)


   Cardiovascular:  No chest pain


   Abdomen:  + pain, + nausea, + vomiting, + constipation, No diarrhea


   Genitourinary - Female:  No dysuria





Past Medical/Surgical History


Medical Problems:


(1) Altered mental status


(2) Chest pain








Family History





FH: HTN (hypertension)


FH: cancer


FH: diabetes mellitus


FH: heart disease


FH: lung disease





Social History


Smoking Status:  Current Some Day Smoker


Alcohol Use:  occasionally


Drug Use:  none


Housing Status:  lives with family


Occupation Status:  employed





Current/Historical Medications


Scheduled


Aspirin (Aspirin Ec), 162 MG PO 3XWK





Physical Exam


Vital Signs











  Date Time  Temp Pulse Resp B/P (MAP) Pulse Ox O2 Delivery O2 Flow Rate FiO2


 


4/16/18 16:28  71      


 


4/16/18 15:54  74  135/58 94 Room Air  


 


4/16/18 13:49 36.6 95 16 145/91 92 Room Air  











Physical Exam


see below





   General Appearance:  no apparent distress


   Head:  normocephalic, atraumatic


   Eyes:  PERRL, + pertinent finding (mild abi-ocular erythema )


   ENT:  normal ENT inspection


   Respiratory/Chest:  lungs clear, normal breath sounds


   Cardiovascular:  regular rate, rhythm, no murmur


   Abdomen / GI:  normal bowel sounds, non tender, soft


   Extremities:  normal inspection, no pedal edema


   Neurologic/Psych:  alert





Medical Decision & Procedures


Laboratory Results


4/16/18 15:15








Red Blood Count 4.84, Mean Corpuscular Volume 90.7, Mean Corpuscular Hemoglobin 

31.8, Mean Corpuscular Hemoglobin Concent 35.1, Mean Platelet Volume 10.1, 

Neutrophils (%) (Auto) 75.7, Lymphocytes (%) (Auto) 18.0, Monocytes (%) (Auto) 

4.6, Eosinophils (%) (Auto) 1.2, Basophils (%) (Auto) 0.2, Neutrophils # (Auto) 

9.29, Lymphocytes # (Auto) 2.21, Monocytes # (Auto) 0.56, Eosinophils # (Auto) 

0.15, Basophils # (Auto) 0.02





4/16/18 15:15

















Test


  4/16/18


15:15 4/16/18


15:25


 


White Blood Count


  12.27 K/uL


(4.8-10.8) 


 


 


Red Blood Count


  4.84 M/uL


(4.2-5.4) 


 


 


Hemoglobin


  15.4 g/dL


(12.0-16.0) 


 


 


Hematocrit 43.9 % (37-47)  


 


Mean Corpuscular Volume


  90.7 fL


() 


 


 


Mean Corpuscular Hemoglobin


  31.8 pg


(25-34) 


 


 


Mean Corpuscular Hemoglobin


Concent 35.1 g/dl


(32-36) 


 


 


Platelet Count


  296 K/uL


(130-400) 


 


 


Mean Platelet Volume


  10.1 fL


(7.4-10.4) 


 


 


Neutrophils (%) (Auto) 75.7 %  


 


Lymphocytes (%) (Auto) 18.0 %  


 


Monocytes (%) (Auto) 4.6 %  


 


Eosinophils (%) (Auto) 1.2 %  


 


Basophils (%) (Auto) 0.2 %  


 


Neutrophils # (Auto)


  9.29 K/uL


(1.4-6.5) 


 


 


Lymphocytes # (Auto)


  2.21 K/uL


(1.2-3.4) 


 


 


Monocytes # (Auto)


  0.56 K/uL


(0.11-0.59) 


 


 


Eosinophils # (Auto)


  0.15 K/uL


(0-0.5) 


 


 


Basophils # (Auto)


  0.02 K/uL


(0-0.2) 


 


 


RDW Standard Deviation


  43.2 fL


(36.4-46.3) 


 


 


RDW Coefficient of Variation


  13.1 %


(11.5-14.5) 


 


 


Immature Granulocyte % (Auto) 0.3 %  


 


Immature Granulocyte # (Auto)


  0.04 K/uL


(0.00-0.02) 


 


 


Urine WBC (Auto)


  10-30 /hpf


(0-5) 


 


 


Urine RBC (Auto) 0-4 /hpf (0-4)  


 


Urine Hyaline Casts (Auto) 1-5 /lpf (0-5)  


 


Urine Epithelial Cells (Auto) >30 /lpf (0-5)  


 


Urine Bacteria (Auto) 1+ (NEG)  


 


Anion Gap


  6.0 mmol/L


(3-11) 


 


 


Est Creatinine Clear Calc


Drug Dose 85.9 ml/min 


  


 


 


Estimated GFR (


American) 83.9 


  


 


 


Estimated GFR (Non-


American 72.4 


  


 


 


BUN/Creatinine Ratio 13.0 (10-20)  


 


Calcium Level


  9.2 mg/dl


(8.5-10.1) 


 


 


Total Bilirubin


  0.5 mg/dl


(0.2-1) 


 


 


Direct Bilirubin


  < 0.1 mg/dl


(0-0.2) 


 


 


Aspartate Amino Transf


(AST/SGOT) 12 U/L (15-37) 


  


 


 


Alanine Aminotransferase


(ALT/SGPT) 21 U/L (12-78) 


  


 


 


Alkaline Phosphatase


  84 U/L


() 


 


 


Total Protein


  8.8 gm/dl


(6.4-8.2) 


 


 


Albumin


  4.7 gm/dl


(3.4-5.0) 


 


 


Lipase


  130 U/L


() 


 











Medical Decision


62y/oF with hx of heart murmur presents with abdominal pain associated with 

nausea/vomiting, fever and constipation (last BM 2 days ago) concerning for 

possible constipation vs. gastroenteritis vs. UTI vs. pancreatitis vs. 

hepatitis vs. IBD vs. IBS





-Ordered KUB - negative, no bowel obstruction/pathologic bowel dilatation; no 

urinary tract calculi


-Ordered CBC with diff, BMP, LFT, Lipase: wnl except mildly elevated WBC of 

12.3 


-Ordered UA: trace ketones, large leuk esterase, 10-30WBC 1+ bacteria with >30 

epi cells


   - likely contaminated but will conservatively treat with Macrobid 100mg BID 

x 3 days





Given history, exam and lab findings abdominal pain, nausea/vomiting and fever 

likely in the setting of UTI. Prescribed Macrobid 100mg BID x 3 days and 

discharged with PCP follow up in 1-2 days. Instructed to see PCP or return to 

the ED if having worsening symptoms.





Impression





 Primary Impression:  


 UTI (urinary tract infection)


Resident Involvement:  Resident Care Provided


Care Provided:  Adult ED





Departure Information


Dispostion


Home / Self-Care





Condition


GOOD





Referrals


Walter Daigle D.O. (PCP)





Patient Instructions


My [facility]





Additional Instructions





Take Macrobid 100mg twice a day for 3 days


Follow up with your primary care doctor in 1-2 days

## 2018-04-16 NOTE — DIAGNOSTIC IMAGING REPORT
KUB



CLINICAL HISTORY: Left lower quadrant abdominal pain. Nausea. Vomiting.    



COMPARISON STUDY:  No previous studies for comparison. 



FINDINGS: There is gas within nondilated large and small bowel loops. There are

no transition zones to indicate bowel obstruction. There are no calcifications

suspicious for renal calculi.



IMPRESSION:  

1. No evidence of pathologic bowel dilatation

2. No urinary tract calculi identified on conventional radiographic imaging 







Electronically signed by:  Oneil Nieves M.D.

4/16/2018 3:51 PM



Dictated Date/Time:  4/16/2018 3:50 PM

## 2018-08-27 ENCOUNTER — HOSPITAL ENCOUNTER (EMERGENCY)
Dept: HOSPITAL 45 - C.EDB | Age: 63
Discharge: HOME | End: 2018-08-27
Payer: SELF-PAY

## 2018-08-27 VITALS — HEART RATE: 78 BPM | DIASTOLIC BLOOD PRESSURE: 55 MMHG | OXYGEN SATURATION: 97 % | SYSTOLIC BLOOD PRESSURE: 117 MMHG

## 2018-08-27 VITALS
WEIGHT: 195.99 LBS | HEIGHT: 65 IN | BODY MASS INDEX: 32.65 KG/M2 | HEIGHT: 65 IN | WEIGHT: 195.99 LBS | BODY MASS INDEX: 32.65 KG/M2

## 2018-08-27 VITALS — TEMPERATURE: 98.78 F

## 2018-08-27 DIAGNOSIS — F17.200: ICD-10-CM

## 2018-08-27 DIAGNOSIS — S20.211A: ICD-10-CM
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
## 2018-08-27 DIAGNOSIS — R46.89: Primary | ICD-10-CM

## 2018-08-27 NOTE — DIAGNOSTIC IMAGING REPORT
CHEST 1 VW FRONT-NOT PORTABLE



HISTORY:  62 years-old Female right rib pain eval for fx acute atypical chest

pain, most pronounced on the right with concern for possible right-sided rib

fracture



COMPARISON: Chest radiograph 1/28/2018



TECHNIQUE: Portable AP view of the chest



FINDINGS: 

Cardiomediastinal and hilar silhouettes are within normal limits. No

pneumothorax, pleural effusion, focal airspace consolidation or overt pulmonary

edema. Bones of the chest appear grossly intact. Multilevel degenerative changes

of the spine. No definite acute displaced rib fracture identified.



IMPRESSION: No acute process of the chest. 







The above report was generated using voice recognition software. It may contain

grammatical, syntax or spelling errors.







Electronically signed by:  Stepan Gupta M.D.

8/27/2018 7:08 PM



Dictated Date/Time:  8/27/2018 7:04 PM

## 2018-08-28 NOTE — EMERGENCY ROOM VISIT NOTE
History


Report prepared by Dhaval:  Lauren Velasquez


Under the Supervision of:  Dr. Duane Cintron M.D.


First contact with patient:  18:35


Chief Complaint:  MENTAL HEALTH EVALUATION


Stated Complaint:  MENTAL HEALTH EVALUATION, NOT TAKING MEDS





History of Present Illness


The patient is a 62 year old female who presents to the Emergency Room for a 

mental health evaluation. The patient states that she had an confrontation with 

her daughter's boyfriend about her daughter's health. The patient states that 

her daughter's boyfriend called the police, and they tackled her to the ground 

after arriving at the scene. She states that she noticed she had pain in her 

right ribs after the incident, but denies any other injury. She describes the 

pain as a "hurting."  She states that she has a slight cough. The patient 

denies any threats to others, homicidal or suicidal ideation, abdominal pain, 

and history of mental health problems other than post-partum depression. She 

notes that she is not on any medication for her mental health.





   Source of History:  patient


   Onset:  PTA


   Position:  head


   Quality:  other (mental health)


   Timing:  other (episode)


   Associated Symptoms:  + cough, + chest pain, No SOB, No abdominal pain


Note:


She denies any threats to others and homicidal or suicidal ideation.





Review of Systems


See HPI for pertinent positives & negatives. A total of 10 systems reviewed and 

were otherwise negative.





Past Medical & Surgical


Medical Problems:


(1) Altered mental status


(2) Chest pain








Family History





FH: HTN (hypertension)


FH: cancer


FH: diabetes mellitus


FH: heart disease


FH: lung disease





Social History


Smoking Status:  Current Every Day Smoker


Alcohol Use:  occasionally


Drug Use:  none


Housing Status:  lives with family


Occupation Status:  employed





Current/Historical Medications


Scheduled PRN


Aspirin (Kusum Aspirin), 325 MG PO DAILY PRN for Pain





Allergies


Coded Allergies:  


     No Known Allergies (Unverified , 1/28/18)





Physical Exam


Vital Signs











  Date Time  Temp Pulse Resp B/P (MAP) Pulse Ox O2 Delivery O2 Flow Rate FiO2


 


8/27/18 21:04  78 17 117/55 97   


 


8/27/18 18:27 37.1 96 17 147/99 94 Room Air  











Physical Exam





Constitutional: Vital signs reviewed.


Eyes: Pupils are equal round reactive to light.  Conjunctiva are noninjected.  


ENT: Pharynx is clear without erythema or exudate.  Mucous membranes are moist.

  Neck supple without meningeal signs.


Respiratory: Clear to auscultation bilaterally.  Breath sounds are equal 

bilaterally. 


Cardiovascular: Regular rate and rhythm.  No rubs or gallops.


GI: Soft, nondistended and nontender.  Bowel sounds are present.


Musculoskeletal: No peripheral edema.  Right lower rib tenderness. No crepitus. 


Integumentary: No cyanosis.


Neurological: The patient is awake and alert.  No focal deficits.


Psychiatric: Normal affect. Not tearful, not manic, no pressured speech.





Medical Decision & Procedures


ER Provider


Diagnostic Interpretation:


Radiology results as stated below per my review and the radiologist's 

interpretation:





CHEST 1 VW FRONT-NOT PORTABLE





HISTORY:  62 years-old Female right rib pain eval for fx acute atypical chest


pain, most pronounced on the right with concern for possible right-sided rib


fracture





COMPARISON: Chest radiograph 1/28/2018





TECHNIQUE: Portable AP view of the chest





FINDINGS: 


Cardiomediastinal and hilar silhouettes are within normal limits. No


pneumothorax, pleural effusion, focal airspace consolidation or overt pulmonary


edema. Bones of the chest appear grossly intact. Multilevel degenerative changes


of the spine. No definite acute displaced rib fracture identified.





IMPRESSION: No acute process of the chest. 











The above report was generated using voice recognition software. It may contain


grammatical, syntax or spelling errors.











Electronically signed by:  Stepan Gupta M.D.


8/27/2018 7:08 PM





Dictated Date/Time:  8/27/2018 7:04 PM





Laboratory Results


Laboratory results as reviewed by me.





ED Course


1839: The patient was evaluated in room A8. A complete history and physical 

exam was performed.





1919: I reevaluated the patient and updated her on the results of her chest x-

ray. The psychiatric  said that the police were at the daughters 

since the patient wouldn't leave for 45 minutes. They stated that unless she 

went for a mental health evaluation they would charge her for trespassing.





2029: The  spoke to the patient's daughter who stated that the 

patient has been argumentative and provoking family conflict. She states that 

the patient denies suicidal and homicidal ideations. She reports that she has 

some paranoid thoughts including the [employer] chasing her. The plan is outpatient 

therapy.





2030: Upon reevaluation, the patient appeared to have improvement of her 

symptoms. I discussed tonight's findings with her. She verbalized agreement of 

the treatment plan. The patient was discharged home.





Medical Decision


This is a 62-year-old female presents for mental health evaluation and right-

sided rib pain.  Differential diagnosis includes contusion, rib fracture, 

pneumothorax.  I did perform a limited focused review of portions of the patient

's old chart on the electronic medical record. The patient was seen in July fro 

mental health evaluation for stating she wanted to hurt herself or others. She 

was discharged home after evaluation.





I did evaluate the patient as noted above.  I did obtain history from the 

patient.  Additional history was obtained from the mental health  

from the police and her daughter and her boyfriend.  Apparently the patient was 

trespassing on her daughter's property.  She would not leave for 45 minutes so 

they called the police.  They threatened to charged with trespassing unless she 

came here for mental health evaluation.  She denies any homicidal or suicidal 

ideation.  She is cooperative here and does not meet any criteria for 

involuntary inpatient treatment.  She does not want inpatient treatment on a 

voluntary basis.  Her daughter confirms that she has had no homicidal or 

suicidal she does state that she has had some paranoia about the [employer] chasing 

her and has been very argumentative coughing familial discord.  She does 

complain of right-sided rib pain after alleging that the police to her on the 

ground.  She denies any other injuries.  I did order and personally review the 

patient's rib and chest x-ray as described above.  There is no evidence of 

fracture or pneumothorax.  I did discuss the test results with the patient.  

She will follow-up as an outpatient with her doctor and a counselor.





Medication Reconcilliation


Current Medication List:  was personally reviewed by me





Blood Pressure Screening


Patient's blood pressure:  Elevated blood pressure


Blood pressure disposition:  Referred to PCP





Impression





 Primary Impression:  


 Uncooperative behavior


 Additional Impression:  


 Contusion of rib on right side





Scribe Attestation


The scribe's documentation has been prepared under my direct and personally 

reviewed by me in its entirety. I confirm that the note above accurately 

reflects all work, treatment, procedures, and medical decision making performed 

by me.





Departure Information


Dispostion


Home / Self-Care





Referrals


Walter Daigle, D.O. (PCP)





Forms


HOME CARE DOCUMENTATION FORM,                                                 

               IMPORTANT VISIT INFORMATION





Patient Instructions


My [facility]





Additional Instructions





You have been examined and treated today on an emergency basis only. This is 

not a substitute for, or an effort to provide, complete comprehensive medical 

care. It is impossible to recognize and treat all injuries or illnesses in a 

single emergency department visit. It is therefore important that you follow up 

closely with your physician and a counselor.  Call as soon as possible for an 

appointment.  Return for worsening symptoms or if you develop shortness of 

breath, fever, thoughts of hurting yourself or others or any other concerning 

symptoms.





Problem Qualifiers








 Additional Impression:  


 Contusion of rib on right side


 Encounter type:  initial encounter  Qualified Codes:  S20.211A - Contusion of 

right front wall of thorax, initial encounter